# Patient Record
Sex: MALE | Race: WHITE | NOT HISPANIC OR LATINO | URBAN - METROPOLITAN AREA
[De-identification: names, ages, dates, MRNs, and addresses within clinical notes are randomized per-mention and may not be internally consistent; named-entity substitution may affect disease eponyms.]

---

## 2023-04-21 LAB
ALANINE AMINOTRANSFERASE (SGPT) (U/L) IN SER/PLAS: 11 U/L (ref 10–52)
ALBUMIN (G/DL) IN SER/PLAS: 4.1 G/DL (ref 3.4–5)
ALKALINE PHOSPHATASE (U/L) IN SER/PLAS: 70 U/L (ref 33–136)
ANION GAP IN SER/PLAS: 18 MMOL/L (ref 10–20)
ASPARTATE AMINOTRANSFERASE (SGOT) (U/L) IN SER/PLAS: 13 U/L (ref 9–39)
BILIRUBIN TOTAL (MG/DL) IN SER/PLAS: 0.4 MG/DL (ref 0–1.2)
CALCIUM (MG/DL) IN SER/PLAS: 8.7 MG/DL (ref 8.6–10.6)
CARBON DIOXIDE, TOTAL (MMOL/L) IN SER/PLAS: 24 MMOL/L (ref 21–32)
CHLORIDE (MMOL/L) IN SER/PLAS: 103 MMOL/L (ref 98–107)
CREATININE (MG/DL) IN SER/PLAS: 1.85 MG/DL (ref 0.5–1.3)
ERYTHROCYTE DISTRIBUTION WIDTH (RATIO) BY AUTOMATED COUNT: 12.8 % (ref 11.5–14.5)
ERYTHROCYTE MEAN CORPUSCULAR HEMOGLOBIN CONCENTRATION (G/DL) BY AUTOMATED: 31.5 G/DL (ref 32–36)
ERYTHROCYTE MEAN CORPUSCULAR VOLUME (FL) BY AUTOMATED COUNT: 96 FL (ref 80–100)
ERYTHROCYTES (10*6/UL) IN BLOOD BY AUTOMATED COUNT: 4.36 X10E12/L (ref 4.5–5.9)
GFR MALE: 33 ML/MIN/1.73M2
GLUCOSE (MG/DL) IN SER/PLAS: 107 MG/DL (ref 74–99)
HEMATOCRIT (%) IN BLOOD BY AUTOMATED COUNT: 41.9 % (ref 41–52)
HEMOGLOBIN (G/DL) IN BLOOD: 13.2 G/DL (ref 13.5–17.5)
LEUKOCYTES (10*3/UL) IN BLOOD BY AUTOMATED COUNT: 10.6 X10E9/L (ref 4.4–11.3)
NATRIURETIC PEPTIDE B (PG/ML) IN SER/PLAS: 84 PG/ML (ref 0–99)
NRBC (PER 100 WBCS) BY AUTOMATED COUNT: 0 /100 WBC (ref 0–0)
PLATELETS (10*3/UL) IN BLOOD AUTOMATED COUNT: 234 X10E9/L (ref 150–450)
POTASSIUM (MMOL/L) IN SER/PLAS: 5.1 MMOL/L (ref 3.5–5.3)
PROTEIN TOTAL: 6.8 G/DL (ref 6.4–8.2)
SODIUM (MMOL/L) IN SER/PLAS: 140 MMOL/L (ref 136–145)
UREA NITROGEN (MG/DL) IN SER/PLAS: 35 MG/DL (ref 6–23)

## 2023-04-25 ENCOUNTER — TELEPHONE (OUTPATIENT)
Dept: PRIMARY CARE | Facility: CLINIC | Age: 88
End: 2023-04-25
Payer: COMMERCIAL

## 2023-04-25 NOTE — TELEPHONE ENCOUNTER
Grisel vera nurse from the [facility] is calling she needs to speak to you to get orders for med changes. Please call [phone].

## 2023-06-02 ENCOUNTER — TELEPHONE (OUTPATIENT)
Dept: PRIMARY CARE | Facility: CLINIC | Age: 88
End: 2023-06-02

## 2023-06-02 NOTE — TELEPHONE ENCOUNTER
Grisel from [facility] wanted to let you know that the patient was admitted to [facility] for a right sided stroke.

## 2023-06-09 ENCOUNTER — NURSING HOME VISIT (OUTPATIENT)
Dept: POST ACUTE CARE | Facility: EXTERNAL LOCATION | Age: 88
End: 2023-06-09
Payer: MEDICARE

## 2023-06-09 DIAGNOSIS — E03.8 OTHER SPECIFIED HYPOTHYROIDISM: Primary | ICD-10-CM

## 2023-06-09 DIAGNOSIS — I63.50 CEREBROVASCULAR ACCIDENT (CVA) DUE TO STENOSIS OF CEREBRAL ARTERY (MULTI): ICD-10-CM

## 2023-06-09 DIAGNOSIS — I25.119 CORONARY ARTERY DISEASE INVOLVING NATIVE CORONARY ARTERY OF NATIVE HEART WITH ANGINA PECTORIS (CMS-HCC): ICD-10-CM

## 2023-06-09 DIAGNOSIS — I10 PRIMARY HYPERTENSION: ICD-10-CM

## 2023-06-09 PROCEDURE — 99306 1ST NF CARE HIGH MDM 50: CPT | Performed by: STUDENT IN AN ORGANIZED HEALTH CARE EDUCATION/TRAINING PROGRAM

## 2023-06-09 NOTE — LETTER
Patient: Dean Florian  : 1926    Encounter Date: 2023    DEAN FLORIAN is a 89 year old Male with PMHX of atrial fibrillation(s/p watchman device, not on anticoagulation), HTN, CAD, CKD, hypothyroidism who was initially managed in [facility] for left frontal subacute stroke after presenting with left sided weakness. He was initially under hospice care with [facility] which was later revoked and further work up was initiated. During hospitalization, his CT head was unremarkable however MRI brain revealed left frontal cortex subacute ischemia concerning for ischemic stroke. MRA head/neck showed no evidence of major cutoff or significant stenosis. 2D Echo revealed normal LV function with EF of 60-65% and impaired LV diastolic filling, no major valvular disorder. He was started on ASA, atorvastatin & clopidogrel. He was evaluated by neurology and Cardiology (EP) recommended low dose sotalol given pt's renal function. MITCH was recommended to r/o LV thrombus but pt's family was not inclined to aggressive treatment. PT/OT evaluated and recommended SNF for rehab. He was discharged on Atorvastatin 80mg, Plavix 75mg, Sotalol 60mg, Alfuzosin 10mg. No ASA as requested by her daughter(POA)  PMHx: As in HPI   PSHx: watchman device placement, cardiac catheterization with 6 stents in place, hernia repair, tonsillectomy  SHx: Denies smoking history, rare EtOH use, denies illicit drug use  Labs reviewed  ROS: All 10 system reviewed and were negative except left sided muscle weakness    Physical Exam  General: Alert & oriented x1, awake & cooperative, not in distress  HEENT: atraumatic head, PERRL, EOMI, moist mucus membrane  Cranial nerves: II-XII gross normal except for decreased hearing (has hearing aid)  Muscle strength: 1/5 in RUE, 4/5 in LUE, 3/5 in RLE, 4/5 in LLE  Muscle tone/reflexes: normal  Sensation: normal to light touch  No involuntary muscle movement or clonus  Lungs: CTAB  Heart: normal S1/S2, no  murmur  Abdomen: Soft, NT, ND, BS present, no organomegaly  Ext: normal, no edema    Assessment/Plan  # Left subacute ischemic stroke  PT/OT at least 5x/weekly  Continue atorvastatin 80mg & Plavix 75mg  Neurochecks  F/u with Dr. Rubio on 07/10      #Afib s/p watchman device  #HTN  #CAD  BP soft  Check vitals before administering medications   Continue Alfuzosin 10mg daily (hold for SBP <100)  Continue Sotalol 60mg daily  Continue Lasix, Lipitor & Plavix  F/u with Dr. Walters on 07/14    # Hypothyroidism  Continue Levothyroxine 75mcg MWF    Will try to reach out to family to discuss, attempted to call on day of evaluation    >45 minutes spent in management of pt      Electronically Signed By: Alex Beebe DO   6/11/23  3:55 PM

## 2023-06-11 PROBLEM — I25.119 CORONARY ARTERY DISEASE INVOLVING NATIVE CORONARY ARTERY OF NATIVE HEART WITH ANGINA PECTORIS (CMS-HCC): Status: ACTIVE | Noted: 2023-06-11

## 2023-06-11 PROBLEM — I10 PRIMARY HYPERTENSION: Status: ACTIVE | Noted: 2023-06-11

## 2023-06-11 PROBLEM — I63.50 CEREBROVASCULAR ACCIDENT (CVA) DUE TO STENOSIS OF CEREBRAL ARTERY (MULTI): Status: ACTIVE | Noted: 2023-06-11

## 2023-06-11 PROBLEM — E03.8 OTHER SPECIFIED HYPOTHYROIDISM: Status: ACTIVE | Noted: 2023-06-11

## 2023-06-11 NOTE — PROGRESS NOTES
IVONE HERNANDEZ is a 89 year old Male with PMHX of atrial fibrillation(s/p watchman device, not on anticoagulation), HTN, CAD, CKD, hypothyroidism who was initially managed in [facility] for left frontal subacute stroke after presenting with left sided weakness. He was initially under hospice care with [facility] which was later revoked and further work up was initiated. During hospitalization, his CT head was unremarkable however MRI brain revealed left frontal cortex subacute ischemia concerning for ischemic stroke. MRA head/neck showed no evidence of major cutoff or significant stenosis. 2D Echo revealed normal LV function with EF of 60-65% and impaired LV diastolic filling, no major valvular disorder. He was started on ASA, atorvastatin & clopidogrel. He was evaluated by neurology and Cardiology (EP) recommended low dose sotalol given pt's renal function. MITCH was recommended to r/o LV thrombus but pt's family was not inclined to aggressive treatment. PT/OT evaluated and recommended SNF for rehab. He was discharged on Atorvastatin 80mg, Plavix 75mg, Sotalol 60mg, Alfuzosin 10mg. No ASA as requested by her daughter(POA)  PMHx: As in HPI   PSHx: watchman device placement, cardiac catheterization with 6 stents in place, hernia repair, tonsillectomy  SHx: Denies smoking history, rare EtOH use, denies illicit drug use  Labs reviewed  ROS: All 10 system reviewed and were negative except left sided muscle weakness    Physical Exam  General: Alert & oriented x1, awake & cooperative, not in distress  HEENT: atraumatic head, PERRL, EOMI, moist mucus membrane  Cranial nerves: II-XII gross normal except for decreased hearing (has hearing aid)  Muscle strength: 1/5 in RUE, 4/5 in LUE, 3/5 in RLE, 4/5 in LLE  Muscle tone/reflexes: normal  Sensation: normal to light touch  No involuntary muscle movement or clonus  Lungs: CTAB  Heart: normal S1/S2, no murmur  Abdomen: Soft, NT, ND, BS present, no organomegaly  Ext: normal, no  edema    Assessment/Plan  # Left subacute ischemic stroke  PT/OT at least 5x/weekly  Continue atorvastatin 80mg & Plavix 75mg  Neurochecks  F/u with Dr. Rubio on 07/10      #Afib s/p watchman device  #HTN  #CAD  BP soft  Check vitals before administering medications   Continue Alfuzosin 10mg daily (hold for SBP <100)  Continue Sotalol 60mg daily  Continue Lasix, Lipitor & Plavix  F/u with Dr. Walters on 07/14    # Hypothyroidism  Continue Levothyroxine 75mcg MWF    Will try to reach out to family to discuss, attempted to call on day of evaluation    >45 minutes spent in management of pt

## 2023-06-12 ENCOUNTER — NURSING HOME VISIT (OUTPATIENT)
Dept: POST ACUTE CARE | Facility: EXTERNAL LOCATION | Age: 88
End: 2023-06-12
Payer: MEDICARE

## 2023-06-12 DIAGNOSIS — I63.50 CEREBROVASCULAR ACCIDENT (CVA) DUE TO STENOSIS OF CEREBRAL ARTERY (MULTI): Primary | ICD-10-CM

## 2023-06-12 DIAGNOSIS — I25.119 CORONARY ARTERY DISEASE INVOLVING NATIVE CORONARY ARTERY OF NATIVE HEART WITH ANGINA PECTORIS (CMS-HCC): ICD-10-CM

## 2023-06-12 DIAGNOSIS — R13.19 OTHER DYSPHAGIA: ICD-10-CM

## 2023-06-12 DIAGNOSIS — I10 PRIMARY HYPERTENSION: ICD-10-CM

## 2023-06-12 DIAGNOSIS — E03.8 OTHER SPECIFIED HYPOTHYROIDISM: ICD-10-CM

## 2023-06-12 PROCEDURE — 99309 SBSQ NF CARE MODERATE MDM 30: CPT | Performed by: STUDENT IN AN ORGANIZED HEALTH CARE EDUCATION/TRAINING PROGRAM

## 2023-06-12 ASSESSMENT — ENCOUNTER SYMPTOMS
CHOKING: 1
APPETITE CHANGE: 1
NEUROLOGICAL NEGATIVE: 1
CARDIOVASCULAR NEGATIVE: 1
GASTROINTESTINAL NEGATIVE: 1
FATIGUE: 1
COUGH: 1
MUSCULOSKELETAL NEGATIVE: 1
PSYCHIATRIC NEGATIVE: 1
DIZZINESS: 0

## 2023-06-12 NOTE — LETTER
Patient: Dean Florian  : 1926    Encounter Date: 2023    Subjective  Patient ID: Dean Florian is a 89 y.o. male.    Pt seen at bedside. Concerns from weekend noted patient having worsening secrections and overall concerns for heart failure. Patients weight has been remaining stable however noted difficulty clearing secrections. Blood pressure noted to be borderline as well. Pt unable to provide any additional history.        Review of Systems   Constitutional:  Positive for appetite change and fatigue.   HENT: Negative.     Respiratory:  Positive for cough and choking.    Cardiovascular: Negative.    Gastrointestinal: Negative.    Musculoskeletal: Negative.    Skin: Negative.    Neurological: Negative.  Negative for dizziness.   Psychiatric/Behavioral: Negative.         ObjectiveVitals Reviewed via facility EMR   Physical Exam  Vitals and nursing note reviewed.   Constitutional:       General: He is not in acute distress.     Appearance: Normal appearance.   HENT:      Mouth/Throat:      Mouth: Mucous membranes are moist.      Pharynx: Oropharynx is clear. No oropharyngeal exudate.   Eyes:      Extraocular Movements: Extraocular movements intact.      Pupils: Pupils are equal, round, and reactive to light.   Cardiovascular:      Rate and Rhythm: Normal rate and regular rhythm.      Pulses: Normal pulses.      Heart sounds: Normal heart sounds. No murmur heard.  Pulmonary:      Effort: Respiratory distress present.      Breath sounds: Stridor present. Wheezing present.      Comments: Difficulty clearing secrections  Abdominal:      General: Abdomen is flat. Bowel sounds are normal. There is no distension.      Tenderness: There is no abdominal tenderness.   Musculoskeletal:         General: Normal range of motion.      Right lower leg: No edema.      Left lower leg: No edema.   Skin:     General: Skin is warm and dry.      Capillary Refill: Capillary refill takes less than 2 seconds.   Neurological:       General: No focal deficit present.      Mental Status: He is alert. Mental status is at baseline.      Cranial Nerves: No cranial nerve deficit.      Motor: No weakness.   Psychiatric:         Mood and Affect: Mood normal.         Thought Content: Thought content normal.         Assessment/Plan  Diagnoses and all orders for this visit:  Cerebrovascular accident (CVA) due to stenosis of cerebral artery (CMS/HCC)  Primary hypertension  Other specified hypothyroidism  Other dysphagia  Coronary artery disease involving native coronary artery of native heart with angina pectoris (CMS/HCC)      Pt seen and examined, overall euvolemic, do not suspect chf. Chest x-ray did not show any infiltrates. Will perform aspiration precautions, HOB elevation to 30 degrees. Check CBC, BNP CMP and prolactin. Will have speech re-evaluate patient as I suspect continued aspiration. Monitor weights closely. Would hold off on aggressive diuresis at this time due to borderline hypotension. Consider GOC discussions pending clinical course. Supportive care    >30 mintues spent in management of pt      Electronically Signed By: Alex Beebe DO   6/12/23  9:26 PM

## 2023-06-13 PROBLEM — N18.9 ANEMIA IN CKD (CHRONIC KIDNEY DISEASE): Status: ACTIVE | Noted: 2023-06-13

## 2023-06-13 PROBLEM — R74.8 HIGH LEVEL OF CARDIAC MARKER: Status: ACTIVE | Noted: 2023-06-13

## 2023-06-13 PROBLEM — M21.371 RIGHT FOOT DROP: Status: ACTIVE | Noted: 2023-06-13

## 2023-06-13 PROBLEM — I51.9 MILD DIASTOLIC DYSFUNCTION: Status: ACTIVE | Noted: 2023-06-13

## 2023-06-13 PROBLEM — I48.20 CHRONIC ATRIAL FIBRILLATION (MULTI): Status: ACTIVE | Noted: 2023-06-13

## 2023-06-13 PROBLEM — E78.5 HLD (HYPERLIPIDEMIA): Status: ACTIVE | Noted: 2023-06-13

## 2023-06-13 PROBLEM — I48.0 PAROXYSMAL ATRIAL FIBRILLATION (MULTI): Status: ACTIVE | Noted: 2023-06-13

## 2023-06-13 PROBLEM — M19.90 OSTEOARTHRITIS: Status: ACTIVE | Noted: 2023-06-13

## 2023-06-13 PROBLEM — N18.31 STAGE 3A CHRONIC KIDNEY DISEASE (MULTI): Status: ACTIVE | Noted: 2023-06-13

## 2023-06-13 PROBLEM — M54.50 LOWER BACK PAIN: Status: ACTIVE | Noted: 2023-06-13

## 2023-06-13 PROBLEM — N18.30 CKD (CHRONIC KIDNEY DISEASE) STAGE 3, GFR 30-59 ML/MIN (MULTI): Status: ACTIVE | Noted: 2023-06-13

## 2023-06-13 PROBLEM — R26.89 IMPAIRMENT OF BALANCE: Status: ACTIVE | Noted: 2023-06-13

## 2023-06-13 PROBLEM — I49.5 SICK SINUS SYNDROME (MULTI): Status: ACTIVE | Noted: 2023-06-13

## 2023-06-13 PROBLEM — R53.1 RIGHT SIDED WEAKNESS: Status: ACTIVE | Noted: 2023-06-13

## 2023-06-13 PROBLEM — R33.8 ACUTE URINARY RETENTION: Status: ACTIVE | Noted: 2023-06-13

## 2023-06-13 PROBLEM — B36.9 DERMATOMYCOSIS: Status: ACTIVE | Noted: 2023-06-13

## 2023-06-13 PROBLEM — Z95.818 PRESENCE OF WATCHMAN LEFT ATRIAL APPENDAGE CLOSURE DEVICE: Status: ACTIVE | Noted: 2023-06-13

## 2023-06-13 PROBLEM — H91.10 PRESBYCUSIS: Status: ACTIVE | Noted: 2023-06-13

## 2023-06-13 PROBLEM — N40.0 BENIGN ENLARGEMENT OF PROSTATE: Status: ACTIVE | Noted: 2023-06-13

## 2023-06-13 PROBLEM — M17.9 DJD (DEGENERATIVE JOINT DISEASE) OF KNEE: Status: ACTIVE | Noted: 2023-06-13

## 2023-06-13 PROBLEM — E55.9 VITAMIN D DEFICIENCY: Status: ACTIVE | Noted: 2023-06-13

## 2023-06-13 PROBLEM — D63.1 ANEMIA IN CKD (CHRONIC KIDNEY DISEASE): Status: ACTIVE | Noted: 2023-06-13

## 2023-06-13 PROBLEM — R05.3 CHRONIC COUGH: Status: ACTIVE | Noted: 2023-06-13

## 2023-06-13 PROBLEM — U07.1 COVID-19 VIRUS INFECTION: Status: ACTIVE | Noted: 2023-06-13

## 2023-06-13 PROBLEM — L84 CORN OF TOE: Status: ACTIVE | Noted: 2023-06-13

## 2023-06-13 PROBLEM — M81.0 OSTEOPOROSIS, SENILE: Status: ACTIVE | Noted: 2023-06-13

## 2023-06-13 PROBLEM — K21.9 GERD (GASTROESOPHAGEAL REFLUX DISEASE): Status: ACTIVE | Noted: 2023-06-13

## 2023-06-13 PROBLEM — F33.9 DEPRESSION, RECURRENT (CMS-HCC): Status: ACTIVE | Noted: 2023-06-13

## 2023-06-13 RX ORDER — PSEUDOEPHEDRINE HCL 30 MG
TABLET ORAL
COMMUNITY
Start: 2020-11-27 | End: 2023-07-26 | Stop reason: ALTCHOICE

## 2023-06-13 RX ORDER — LOSARTAN POTASSIUM 25 MG/1
1 TABLET ORAL DAILY
COMMUNITY
Start: 2018-12-03 | End: 2023-07-26 | Stop reason: ALTCHOICE

## 2023-06-13 RX ORDER — LEVOTHYROXINE SODIUM 75 UG/1
1 TABLET ORAL 3 TIMES WEEKLY
COMMUNITY
Start: 2016-04-12

## 2023-06-13 RX ORDER — CLOPIDOGREL BISULFATE 75 MG/1
1 TABLET ORAL DAILY
COMMUNITY
Start: 2013-12-17

## 2023-06-13 RX ORDER — NYSTATIN AND TRIAMCINOLONE ACETONIDE 100000; 1 [USP'U]/G; MG/G
OINTMENT TOPICAL 2 TIMES DAILY
COMMUNITY
Start: 2023-02-16 | End: 2023-07-26 | Stop reason: ALTCHOICE

## 2023-06-13 RX ORDER — GLUC/MSM/COLGN2/HYAL/ANTIARTH3 375-375-20
1 TABLET ORAL DAILY
COMMUNITY

## 2023-06-13 RX ORDER — SPIRONOLACTONE 25 MG/1
TABLET ORAL DAILY
COMMUNITY
End: 2023-07-26 | Stop reason: ALTCHOICE

## 2023-06-13 RX ORDER — ALBUTEROL SULFATE 0.83 MG/ML
2.5 SOLUTION RESPIRATORY (INHALATION)
COMMUNITY

## 2023-06-13 RX ORDER — MULTIVITAMIN
1 TABLET ORAL DAILY
COMMUNITY
End: 2023-07-26 | Stop reason: ALTCHOICE

## 2023-06-13 RX ORDER — SOTALOL HYDROCHLORIDE 80 MG/1
TABLET ORAL 2 TIMES DAILY
COMMUNITY
Start: 2022-06-04 | End: 2023-07-26 | Stop reason: ALTCHOICE

## 2023-06-13 RX ORDER — FUROSEMIDE 20 MG/1
TABLET ORAL 2 TIMES DAILY
COMMUNITY
End: 2023-08-22 | Stop reason: SDUPTHER

## 2023-06-13 NOTE — PROGRESS NOTES
Subjective   Patient ID: Dean Florian is a 89 y.o. male.    Pt seen at bedside. Concerns from weekend noted patient having worsening secrections and overall concerns for heart failure. Patients weight has been remaining stable however noted difficulty clearing secrections. Blood pressure noted to be borderline as well. Pt unable to provide any additional history.        Review of Systems   Constitutional:  Positive for appetite change and fatigue.   HENT: Negative.     Respiratory:  Positive for cough and choking.    Cardiovascular: Negative.    Gastrointestinal: Negative.    Musculoskeletal: Negative.    Skin: Negative.    Neurological: Negative.  Negative for dizziness.   Psychiatric/Behavioral: Negative.         Objective Vitals Reviewed via facility EMR   Physical Exam  Vitals and nursing note reviewed.   Constitutional:       General: He is not in acute distress.     Appearance: Normal appearance.   HENT:      Mouth/Throat:      Mouth: Mucous membranes are moist.      Pharynx: Oropharynx is clear. No oropharyngeal exudate.   Eyes:      Extraocular Movements: Extraocular movements intact.      Pupils: Pupils are equal, round, and reactive to light.   Cardiovascular:      Rate and Rhythm: Normal rate and regular rhythm.      Pulses: Normal pulses.      Heart sounds: Normal heart sounds. No murmur heard.  Pulmonary:      Effort: Respiratory distress present.      Breath sounds: Stridor present. Wheezing present.      Comments: Difficulty clearing secrections  Abdominal:      General: Abdomen is flat. Bowel sounds are normal. There is no distension.      Tenderness: There is no abdominal tenderness.   Musculoskeletal:         General: Normal range of motion.      Right lower leg: No edema.      Left lower leg: No edema.   Skin:     General: Skin is warm and dry.      Capillary Refill: Capillary refill takes less than 2 seconds.   Neurological:      General: No focal deficit present.      Mental Status: He is alert.  Mental status is at baseline.      Cranial Nerves: No cranial nerve deficit.      Motor: No weakness.   Psychiatric:         Mood and Affect: Mood normal.         Thought Content: Thought content normal.         Assessment/Plan   Diagnoses and all orders for this visit:  Cerebrovascular accident (CVA) due to stenosis of cerebral artery (CMS/[facility])  Primary hypertension  Other specified hypothyroidism  Other dysphagia  Coronary artery disease involving native coronary artery of native heart with angina pectoris (CMS/[facility])      Pt seen and examined, overall euvolemic, do not suspect chf. Chest x-ray did not show any infiltrates. Will perform aspiration precautions, HOB elevation to 30 degrees. Check CBC, BNP CMP and prolactin. Will have speech re-evaluate patient as I suspect continued aspiration. Monitor weights closely. Would hold off on aggressive diuresis at this time due to borderline hypotension. Consider GOC discussions pending clinical course. Supportive care    >30 mintues spent in management of pt

## 2023-06-14 ENCOUNTER — NURSING HOME VISIT (OUTPATIENT)
Dept: POST ACUTE CARE | Facility: EXTERNAL LOCATION | Age: 88
End: 2023-06-14
Payer: MEDICARE

## 2023-06-14 DIAGNOSIS — N18.31 STAGE 3A CHRONIC KIDNEY DISEASE (MULTI): ICD-10-CM

## 2023-06-14 DIAGNOSIS — I48.0 PAROXYSMAL ATRIAL FIBRILLATION (MULTI): ICD-10-CM

## 2023-06-14 DIAGNOSIS — Z71.89 GOALS OF CARE, COUNSELING/DISCUSSION: Primary | ICD-10-CM

## 2023-06-14 DIAGNOSIS — I63.50 CEREBROVASCULAR ACCIDENT (CVA) DUE TO STENOSIS OF CEREBRAL ARTERY (MULTI): ICD-10-CM

## 2023-06-14 DIAGNOSIS — I10 PRIMARY HYPERTENSION: ICD-10-CM

## 2023-06-14 DIAGNOSIS — R13.19 OTHER DYSPHAGIA: ICD-10-CM

## 2023-06-14 PROCEDURE — 99310 SBSQ NF CARE HIGH MDM 45: CPT | Performed by: STUDENT IN AN ORGANIZED HEALTH CARE EDUCATION/TRAINING PROGRAM

## 2023-06-14 ASSESSMENT — ENCOUNTER SYMPTOMS
GASTROINTESTINAL NEGATIVE: 1
NEUROLOGICAL NEGATIVE: 1
MUSCULOSKELETAL NEGATIVE: 1
CARDIOVASCULAR NEGATIVE: 1
CONSTITUTIONAL NEGATIVE: 1
WHEEZING: 1
PSYCHIATRIC NEGATIVE: 1

## 2023-06-14 NOTE — LETTER
Patient: Dean Florian  : 1926    Encounter Date: 2023    Subjective  Patient ID: Dean Florian is a 89 y.o. male.    Pt seen and examined, BNP elevated however suspect that this is chronic, started on oral diuresis. In addition, procal was negative so low suspicion for pneumonia. Patient has had minimal improvement with therapy and is still having difficulty getting out of bed. Pt was evaluated by speech who did recommend a modified diet. Was able to discuss care and goals with daughter. She states that this has been a chronic issue and patient has been to multiple nursing facilities. Was previously on hospice care as well. POA noted that they wanted the patient to continue to get therapy however would be interested in palliative care moving forward. No additional issues or concerns.         Review of Systems   Constitutional: Negative.    HENT: Negative.     Respiratory:  Positive for wheezing.         Dysphagia   Cardiovascular: Negative.    Gastrointestinal: Negative.    Musculoskeletal: Negative.    Skin: Negative.    Neurological: Negative.    Psychiatric/Behavioral: Negative.         ObjectiveVitals Reviewed via facility EMR   Physical Exam  Vitals and nursing note reviewed.   Constitutional:       General: He is not in acute distress.     Appearance: Normal appearance.   HENT:      Mouth/Throat:      Mouth: Mucous membranes are moist.      Pharynx: Oropharynx is clear. No oropharyngeal exudate.   Eyes:      Extraocular Movements: Extraocular movements intact.      Pupils: Pupils are equal, round, and reactive to light.   Cardiovascular:      Rate and Rhythm: Normal rate and regular rhythm.      Pulses: Normal pulses.      Heart sounds: Normal heart sounds. No murmur heard.  Pulmonary:      Effort: Pulmonary effort is normal. No respiratory distress.      Breath sounds: Wheezing and rales present.      Comments: Secrections present  Abdominal:      General: Abdomen is flat. Bowel sounds are normal.  There is no distension.      Tenderness: There is no abdominal tenderness.   Musculoskeletal:         General: Normal range of motion.      Right lower leg: No edema.      Left lower leg: No edema.   Skin:     General: Skin is warm and dry.      Capillary Refill: Capillary refill takes less than 2 seconds.   Neurological:      General: No focal deficit present.      Mental Status: He is alert. Mental status is at baseline.      Comments: At baseline   Psychiatric:         Mood and Affect: Mood normal.         Thought Content: Thought content normal.         Assessment/Plan  Diagnoses and all orders for this visit:  Goals of care, counseling/discussion  Stage 3a chronic kidney disease  Other dysphagia  Primary hypertension  Paroxysmal atrial fibrillation (CMS/HCC)  Cerebrovascular accident (CVA) due to stenosis of cerebral artery (CMS/HCC)      Pt seen and examined, overall medically stable, SLP did note that patient has chronic dysphagia and did recommend a modified diet however is still high risk of aspiration. Would not recommend feeding by other means as pt did not tolerate these and likely would gain no benefit from this. Discussed goals of care with POA and noted that patient might benefit from pallaitive.hospice evaluation. They were agreeable, will make referrals per family choice. Continue supprotive care, pt/ot    >45 minutes spent in management of pt      Electronically Signed By: Alex Beebe DO   6/14/23  9:44 PM

## 2023-06-15 NOTE — PROGRESS NOTES
Subjective   Patient ID: Dean Florian is a 89 y.o. male.    Pt seen and examined, BNP elevated however suspect that this is chronic, started on oral diuresis. In addition, procal was negative so low suspicion for pneumonia. Patient has had minimal improvement with therapy and is still having difficulty getting out of bed. Pt was evaluated by speech who did recommend a modified diet. Was able to discuss care and goals with daughter. She states that this has been a chronic issue and patient has been to multiple nursing facilities. Was previously on hospice care as well. POA noted that they wanted the patient to continue to get therapy however would be interested in palliative care moving forward. No additional issues or concerns.         Review of Systems   Constitutional: Negative.    HENT: Negative.     Respiratory:  Positive for wheezing.         Dysphagia   Cardiovascular: Negative.    Gastrointestinal: Negative.    Musculoskeletal: Negative.    Skin: Negative.    Neurological: Negative.    Psychiatric/Behavioral: Negative.         Objective Vitals Reviewed via facility EMR   Physical Exam  Vitals and nursing note reviewed.   Constitutional:       General: He is not in acute distress.     Appearance: Normal appearance.   HENT:      Mouth/Throat:      Mouth: Mucous membranes are moist.      Pharynx: Oropharynx is clear. No oropharyngeal exudate.   Eyes:      Extraocular Movements: Extraocular movements intact.      Pupils: Pupils are equal, round, and reactive to light.   Cardiovascular:      Rate and Rhythm: Normal rate and regular rhythm.      Pulses: Normal pulses.      Heart sounds: Normal heart sounds. No murmur heard.  Pulmonary:      Effort: Pulmonary effort is normal. No respiratory distress.      Breath sounds: Wheezing and rales present.      Comments: Secrections present  Abdominal:      General: Abdomen is flat. Bowel sounds are normal. There is no distension.      Tenderness: There is no abdominal  tenderness.   Musculoskeletal:         General: Normal range of motion.      Right lower leg: No edema.      Left lower leg: No edema.   Skin:     General: Skin is warm and dry.      Capillary Refill: Capillary refill takes less than 2 seconds.   Neurological:      General: No focal deficit present.      Mental Status: He is alert. Mental status is at baseline.      Comments: At baseline   Psychiatric:         Mood and Affect: Mood normal.         Thought Content: Thought content normal.         Assessment/Plan   Diagnoses and all orders for this visit:  Goals of care, counseling/discussion  Stage 3a chronic kidney disease  Other dysphagia  Primary hypertension  Paroxysmal atrial fibrillation (CMS/HCC)  Cerebrovascular accident (CVA) due to stenosis of cerebral artery (CMS/HCC)      Pt seen and examined, overall medically stable, SLP did note that patient has chronic dysphagia and did recommend a modified diet however is still high risk of aspiration. Would not recommend feeding by other means as pt did not tolerate these and likely would gain no benefit from this. Discussed goals of care with POA and noted that patient might benefit from pallaitive.hospice evaluation. They were agreeable, will make referrals per family choice. Continue supprotive care, pt/ot    >45 minutes spent in management of pt

## 2023-06-16 ENCOUNTER — APPOINTMENT (OUTPATIENT)
Dept: PRIMARY CARE | Facility: CLINIC | Age: 88
End: 2023-06-16
Payer: MEDICARE

## 2023-06-16 ENCOUNTER — NURSING HOME VISIT (OUTPATIENT)
Dept: POST ACUTE CARE | Facility: EXTERNAL LOCATION | Age: 88
End: 2023-06-16

## 2023-06-16 DIAGNOSIS — Z71.89 GOALS OF CARE, COUNSELING/DISCUSSION: ICD-10-CM

## 2023-06-16 DIAGNOSIS — I48.20 CHRONIC ATRIAL FIBRILLATION (MULTI): Primary | ICD-10-CM

## 2023-06-16 DIAGNOSIS — I25.119 CORONARY ARTERY DISEASE INVOLVING NATIVE CORONARY ARTERY OF NATIVE HEART WITH ANGINA PECTORIS (CMS-HCC): ICD-10-CM

## 2023-06-16 DIAGNOSIS — N18.31 STAGE 3A CHRONIC KIDNEY DISEASE (MULTI): ICD-10-CM

## 2023-06-16 DIAGNOSIS — I49.5 SICK SINUS SYNDROME (MULTI): ICD-10-CM

## 2023-06-16 DIAGNOSIS — N18.30 STAGE 3 CHRONIC KIDNEY DISEASE, UNSPECIFIED WHETHER STAGE 3A OR 3B CKD (MULTI): ICD-10-CM

## 2023-06-16 PROCEDURE — 99308 SBSQ NF CARE LOW MDM 20: CPT | Performed by: STUDENT IN AN ORGANIZED HEALTH CARE EDUCATION/TRAINING PROGRAM

## 2023-06-16 NOTE — LETTER
Patient: Dean Florian  : 1926    Encounter Date: 2023    Subjective  Patient ID: Dean Florian is a 89 y.o. male.    Pt seen and examined, having minimal benefit with therapy and having difficulty clearing secrections. Family considering palliative care and this was discussed with POA. No additional issues.        Review of Systems   Constitutional: Negative.    HENT: Negative.     Respiratory: Negative.     Gastrointestinal: Negative.    Musculoskeletal: Negative.    Skin: Negative.    Neurological: Negative.    Psychiatric/Behavioral: Negative.         ObjectiveVitals Reviewed via facility EMR   Physical Exam  Vitals and nursing note reviewed.   Constitutional:       General: He is not in acute distress.     Appearance: Normal appearance.   HENT:      Mouth/Throat:      Mouth: Mucous membranes are moist.      Pharynx: Oropharynx is clear. No oropharyngeal exudate.   Eyes:      Extraocular Movements: Extraocular movements intact.      Pupils: Pupils are equal, round, and reactive to light.   Cardiovascular:      Rate and Rhythm: Normal rate and regular rhythm.      Pulses: Normal pulses.      Heart sounds: Normal heart sounds. No murmur heard.  Pulmonary:      Effort: Pulmonary effort is normal. No respiratory distress.      Comments: Secrections present  Abdominal:      General: Abdomen is flat. Bowel sounds are normal. There is no distension.      Tenderness: There is no abdominal tenderness.   Musculoskeletal:         General: Normal range of motion.      Right lower leg: No edema.      Left lower leg: No edema.   Skin:     General: Skin is warm and dry.      Capillary Refill: Capillary refill takes less than 2 seconds.   Neurological:      General: No focal deficit present.      Mental Status: He is alert. Mental status is at baseline.      Cranial Nerves: No cranial nerve deficit.      Motor: No weakness.   Psychiatric:         Mood and Affect: Mood normal.         Thought Content: Thought content  normal.         Assessment/Plan  Diagnoses and all orders for this visit:  Chronic atrial fibrillation (CMS/HCC)  Coronary artery disease involving native coronary artery of native heart with angina pectoris (CMS/[facility])  Sick sinus syndrome (CMS/[facility])  Stage 3 chronic kidney disease, unspecified whether stage 3a or 3b CKD (CMS/[facility])  Stage 3a chronic kidney disease  Goals of care, counseling/discussion  pt seen and examined, having minimal improvement with therapy, continue goals of care discussions, palliative care consultation, monitor labs, continue aspiration precautions        Electronically Signed By: Alex Beebe DO   6/18/23  4:45 PM

## 2023-06-18 ASSESSMENT — ENCOUNTER SYMPTOMS
MUSCULOSKELETAL NEGATIVE: 1
RESPIRATORY NEGATIVE: 1
CONSTITUTIONAL NEGATIVE: 1
NEUROLOGICAL NEGATIVE: 1
GASTROINTESTINAL NEGATIVE: 1
PSYCHIATRIC NEGATIVE: 1

## 2023-06-18 NOTE — PROGRESS NOTES
Subjective   Patient ID: Dean Florian is a 89 y.o. male.    Pt seen and examined, having minimal benefit with therapy and having difficulty clearing secrections. Family considering palliative care and this was discussed with POA. No additional issues.        Review of Systems   Constitutional: Negative.    HENT: Negative.     Respiratory: Negative.     Gastrointestinal: Negative.    Musculoskeletal: Negative.    Skin: Negative.    Neurological: Negative.    Psychiatric/Behavioral: Negative.         Objective Vitals Reviewed via facility EMR   Physical Exam  Vitals and nursing note reviewed.   Constitutional:       General: He is not in acute distress.     Appearance: Normal appearance.   HENT:      Mouth/Throat:      Mouth: Mucous membranes are moist.      Pharynx: Oropharynx is clear. No oropharyngeal exudate.   Eyes:      Extraocular Movements: Extraocular movements intact.      Pupils: Pupils are equal, round, and reactive to light.   Cardiovascular:      Rate and Rhythm: Normal rate and regular rhythm.      Pulses: Normal pulses.      Heart sounds: Normal heart sounds. No murmur heard.  Pulmonary:      Effort: Pulmonary effort is normal. No respiratory distress.      Comments: Secrections present  Abdominal:      General: Abdomen is flat. Bowel sounds are normal. There is no distension.      Tenderness: There is no abdominal tenderness.   Musculoskeletal:         General: Normal range of motion.      Right lower leg: No edema.      Left lower leg: No edema.   Skin:     General: Skin is warm and dry.      Capillary Refill: Capillary refill takes less than 2 seconds.   Neurological:      General: No focal deficit present.      Mental Status: He is alert. Mental status is at baseline.      Cranial Nerves: No cranial nerve deficit.      Motor: No weakness.   Psychiatric:         Mood and Affect: Mood normal.         Thought Content: Thought content normal.         Assessment/Plan   Diagnoses and all orders for this  visit:  Chronic atrial fibrillation (CMS/[facility])  Coronary artery disease involving native coronary artery of native heart with angina pectoris (CMS/[facility])  Sick sinus syndrome (CMS/[facility])  Stage 3 chronic kidney disease, unspecified whether stage 3a or 3b CKD (CMS/[facility])  Stage 3a chronic kidney disease  Goals of care, counseling/discussion  pt seen and examined, having minimal improvement with therapy, continue goals of care discussions, palliative care consultation, monitor labs, continue aspiration precautions

## 2023-06-21 ENCOUNTER — NURSING HOME VISIT (OUTPATIENT)
Dept: POST ACUTE CARE | Facility: EXTERNAL LOCATION | Age: 88
End: 2023-06-21
Payer: MEDICARE

## 2023-06-21 DIAGNOSIS — I63.50 CEREBROVASCULAR ACCIDENT (CVA) DUE TO STENOSIS OF CEREBRAL ARTERY (MULTI): Primary | ICD-10-CM

## 2023-06-21 DIAGNOSIS — Z71.89 GOALS OF CARE, COUNSELING/DISCUSSION: ICD-10-CM

## 2023-06-21 DIAGNOSIS — M54.50 LOW BACK PAIN, UNSPECIFIED BACK PAIN LATERALITY, UNSPECIFIED CHRONICITY, UNSPECIFIED WHETHER SCIATICA PRESENT: ICD-10-CM

## 2023-06-21 DIAGNOSIS — N18.30 STAGE 3 CHRONIC KIDNEY DISEASE, UNSPECIFIED WHETHER STAGE 3A OR 3B CKD (MULTI): ICD-10-CM

## 2023-06-21 DIAGNOSIS — E78.5 HYPERLIPIDEMIA, UNSPECIFIED HYPERLIPIDEMIA TYPE: ICD-10-CM

## 2023-06-21 DIAGNOSIS — R26.89 IMPAIRMENT OF BALANCE: ICD-10-CM

## 2023-06-21 DIAGNOSIS — R74.8 HIGH LEVEL OF CARDIAC MARKER: ICD-10-CM

## 2023-06-21 PROCEDURE — 99309 SBSQ NF CARE MODERATE MDM 30: CPT | Performed by: STUDENT IN AN ORGANIZED HEALTH CARE EDUCATION/TRAINING PROGRAM

## 2023-06-21 NOTE — LETTER
Patient: Dean Florian  : 1926    Encounter Date: 2023    Subjective  Patient ID: Dean Florian is a 89 y.o. male.    PT seen and evaluated, daughter is at bedside as well. Patient has had minimal improvement with therapy. Is off oxygen, daughter is wondering about overall prognosis. Still having difficulty swallowing which is baseline. No additional issues.        Review of Systems   Constitutional: Negative.    HENT: Negative.     Respiratory: Negative.     Gastrointestinal: Negative.    Musculoskeletal: Negative.    Skin: Negative.    Neurological: Negative.    Psychiatric/Behavioral: Negative.         ObjectiveVitals Reviewed via facility EMR   Physical Exam  Vitals and nursing note reviewed.   Constitutional:       General: He is not in acute distress.     Appearance: Normal appearance.   HENT:      Mouth/Throat:      Mouth: Mucous membranes are moist.      Pharynx: Oropharynx is clear. No oropharyngeal exudate.   Eyes:      Extraocular Movements: Extraocular movements intact.      Pupils: Pupils are equal, round, and reactive to light.   Cardiovascular:      Rate and Rhythm: Normal rate and regular rhythm.      Pulses: Normal pulses.      Heart sounds: Normal heart sounds. No murmur heard.  Pulmonary:      Effort: Pulmonary effort is normal. No respiratory distress.      Breath sounds: Wheezing present.      Comments: Mild wheezing at baseline  Abdominal:      General: Abdomen is flat. Bowel sounds are normal. There is no distension.      Tenderness: There is no abdominal tenderness.   Musculoskeletal:         General: Normal range of motion.      Right lower leg: No edema.      Left lower leg: No edema.   Skin:     General: Skin is warm and dry.      Capillary Refill: Capillary refill takes less than 2 seconds.   Neurological:      General: No focal deficit present.      Mental Status: He is alert. Mental status is at baseline.      Cranial Nerves: No cranial nerve deficit.      Motor: No weakness.       Comments: Weakness at baseline   Psychiatric:         Mood and Affect: Mood normal.         Thought Content: Thought content normal.         Assessment/Plan  Diagnoses and all orders for this visit:  Cerebrovascular accident (CVA) due to stenosis of cerebral artery (CMS/[facility])  Hyperlipidemia, unspecified hyperlipidemia type  Impairment of balance  Low back pain, unspecified back pain laterality, unspecified chronicity, unspecified whether sciatica present  High level of cardiac marker  Goals of care, counseling/discussion  Stage 3 chronic kidney disease, unspecified whether stage 3a or 3b CKD (CMS/[facility])      pt seen and examined, overall medically stable, discussed with POA that patient may be at his current baseline and that therapy may not provide much additional benefit. Will need additional resources at home, discuss with SW. Patient is at high risk of rehospitalization due to multiple medical comorbidities and difficulties with ADLs and patients POA voices understanding. Continue PT/OT, dispo planning, GOC conversations    >30 minutes spent in management of pt      Electronically Signed By: Alex Beebe DO   6/22/23  9:50 PM

## 2023-06-22 ASSESSMENT — ENCOUNTER SYMPTOMS
RESPIRATORY NEGATIVE: 1
PSYCHIATRIC NEGATIVE: 1
GASTROINTESTINAL NEGATIVE: 1
CONSTITUTIONAL NEGATIVE: 1
NEUROLOGICAL NEGATIVE: 1
MUSCULOSKELETAL NEGATIVE: 1

## 2023-06-23 ENCOUNTER — NURSING HOME VISIT (OUTPATIENT)
Dept: POST ACUTE CARE | Facility: EXTERNAL LOCATION | Age: 88
End: 2023-06-23
Payer: MEDICARE

## 2023-06-23 DIAGNOSIS — R31.0 GROSS HEMATURIA: Primary | ICD-10-CM

## 2023-06-23 DIAGNOSIS — R53.1 RIGHT SIDED WEAKNESS: ICD-10-CM

## 2023-06-23 DIAGNOSIS — I63.50 CEREBROVASCULAR ACCIDENT (CVA) DUE TO STENOSIS OF CEREBRAL ARTERY (MULTI): ICD-10-CM

## 2023-06-23 DIAGNOSIS — R13.19 OTHER DYSPHAGIA: ICD-10-CM

## 2023-06-23 DIAGNOSIS — N18.30 STAGE 3 CHRONIC KIDNEY DISEASE, UNSPECIFIED WHETHER STAGE 3A OR 3B CKD (MULTI): ICD-10-CM

## 2023-06-23 DIAGNOSIS — I25.119 CORONARY ARTERY DISEASE INVOLVING NATIVE CORONARY ARTERY OF NATIVE HEART WITH ANGINA PECTORIS (CMS-HCC): ICD-10-CM

## 2023-06-23 DIAGNOSIS — I10 PRIMARY HYPERTENSION: ICD-10-CM

## 2023-06-23 PROCEDURE — 99308 SBSQ NF CARE LOW MDM 20: CPT | Performed by: STUDENT IN AN ORGANIZED HEALTH CARE EDUCATION/TRAINING PROGRAM

## 2023-06-23 NOTE — PROGRESS NOTES
Subjective   Patient ID: Dean Florian is a 89 y.o. male.    PT seen and evaluated, daughter is at bedside as well. Patient has had minimal improvement with therapy. Is off oxygen, daughter is wondering about overall prognosis. Still having difficulty swallowing which is baseline. No additional issues.        Review of Systems   Constitutional: Negative.    HENT: Negative.     Respiratory: Negative.     Gastrointestinal: Negative.    Musculoskeletal: Negative.    Skin: Negative.    Neurological: Negative.    Psychiatric/Behavioral: Negative.         Objective Vitals Reviewed via facility EMR   Physical Exam  Vitals and nursing note reviewed.   Constitutional:       General: He is not in acute distress.     Appearance: Normal appearance.   HENT:      Mouth/Throat:      Mouth: Mucous membranes are moist.      Pharynx: Oropharynx is clear. No oropharyngeal exudate.   Eyes:      Extraocular Movements: Extraocular movements intact.      Pupils: Pupils are equal, round, and reactive to light.   Cardiovascular:      Rate and Rhythm: Normal rate and regular rhythm.      Pulses: Normal pulses.      Heart sounds: Normal heart sounds. No murmur heard.  Pulmonary:      Effort: Pulmonary effort is normal. No respiratory distress.      Breath sounds: Wheezing present.      Comments: Mild wheezing at baseline  Abdominal:      General: Abdomen is flat. Bowel sounds are normal. There is no distension.      Tenderness: There is no abdominal tenderness.   Musculoskeletal:         General: Normal range of motion.      Right lower leg: No edema.      Left lower leg: No edema.   Skin:     General: Skin is warm and dry.      Capillary Refill: Capillary refill takes less than 2 seconds.   Neurological:      General: No focal deficit present.      Mental Status: He is alert. Mental status is at baseline.      Cranial Nerves: No cranial nerve deficit.      Motor: No weakness.      Comments: Weakness at baseline   Psychiatric:         Mood and  Affect: Mood normal.         Thought Content: Thought content normal.         Assessment/Plan   Diagnoses and all orders for this visit:  Cerebrovascular accident (CVA) due to stenosis of cerebral artery (CMS/[facility])  Hyperlipidemia, unspecified hyperlipidemia type  Impairment of balance  Low back pain, unspecified back pain laterality, unspecified chronicity, unspecified whether sciatica present  High level of cardiac marker  Goals of care, counseling/discussion  Stage 3 chronic kidney disease, unspecified whether stage 3a or 3b CKD (CMS/[facility])      pt seen and examined, overall medically stable, discussed with POA that patient may be at his current baseline and that therapy may not provide much additional benefit. Will need additional resources at home, discuss with SW. Patient is at high risk of rehospitalization due to multiple medical comorbidities and difficulties with ADLs and patients POA voices understanding. Continue PT/OT, dispo planning, GOC conversations    >30 minutes spent in management of pt

## 2023-06-23 NOTE — LETTER
Patient: Dean Florian  : 1926    Encounter Date: 2023    Subjective  Patient ID: Dean Florian is a 89 y.o. male.    Pt seen at bedside. Was trying to get out of bed this morning. Patient also has noted hematuria coming from his suárez, however, was seen tugging at suárez. Reoriented patient and placed back into bed as he is high fall precautions. Reports no additional issues.        Review of Systems   Reason unable to perform ROS: unable to fully obtain due to baseline status.   Constitutional: Negative.    HENT: Negative.     Respiratory: Negative.     Cardiovascular: Negative.    Gastrointestinal: Negative.    Musculoskeletal: Negative.    Neurological: Negative.    Psychiatric/Behavioral: Negative.         ObjectiveVitals Reviewed via facility EMR   Physical Exam  Vitals and nursing note reviewed.   Constitutional:       General: He is not in acute distress.     Appearance: Normal appearance.   HENT:      Mouth/Throat:      Mouth: Mucous membranes are moist.      Pharynx: Oropharynx is clear. No oropharyngeal exudate.   Eyes:      Extraocular Movements: Extraocular movements intact.      Pupils: Pupils are equal, round, and reactive to light.   Cardiovascular:      Rate and Rhythm: Normal rate and regular rhythm.      Pulses: Normal pulses.      Heart sounds: Normal heart sounds. No murmur heard.  Pulmonary:      Effort: Pulmonary effort is normal. No respiratory distress.   Abdominal:      General: Abdomen is flat. Bowel sounds are normal. There is no distension.      Tenderness: There is no abdominal tenderness.   Musculoskeletal:         General: Normal range of motion.      Right lower leg: No edema.      Left lower leg: No edema.      Comments: R sided weakness at baseline   Skin:     General: Skin is warm and dry.      Capillary Refill: Capillary refill takes less than 2 seconds.   Neurological:      General: No focal deficit present.      Mental Status: He is alert. Mental status is at baseline.       Cranial Nerves: No cranial nerve deficit.      Motor: No weakness.   Psychiatric:         Mood and Affect: Mood normal.         Thought Content: Thought content normal.         Assessment/Plan  Diagnoses and all orders for this visit:  Gross hematuria  Stage 3 chronic kidney disease, unspecified whether stage 3a or 3b CKD (CMS/HCC)  Other dysphagia  Primary hypertension  Coronary artery disease involving native coronary artery of native heart with angina pectoris (CMS/HCC)  Cerebrovascular accident (CVA) due to stenosis of cerebral artery (CMS/HCC)  Right sided weakness    Check CBC and UA due to hematuria, suspect trauma to suárez that is causing hematuria, unable to remove due to urinary retention, family updated, continue PT/OT, GOC, likely would benefit from palliative follow up      Electronically Signed By: Alex Beebe DO   6/24/23  1:46 PM

## 2023-06-24 PROBLEM — R31.0 GROSS HEMATURIA: Status: ACTIVE | Noted: 2023-06-24

## 2023-06-24 ASSESSMENT — ENCOUNTER SYMPTOMS
NEUROLOGICAL NEGATIVE: 1
GASTROINTESTINAL NEGATIVE: 1
CONSTITUTIONAL NEGATIVE: 1
PSYCHIATRIC NEGATIVE: 1
CARDIOVASCULAR NEGATIVE: 1
RESPIRATORY NEGATIVE: 1
MUSCULOSKELETAL NEGATIVE: 1

## 2023-06-24 NOTE — PROGRESS NOTES
Subjective   Patient ID: Dean Florian is a 89 y.o. male.    Pt seen at bedside. Was trying to get out of bed this morning. Patient also has noted hematuria coming from his suárez, however, was seen tugging at suárez. Reoriented patient and placed back into bed as he is high fall precautions. Reports no additional issues.        Review of Systems   Reason unable to perform ROS: unable to fully obtain due to baseline status.   Constitutional: Negative.    HENT: Negative.     Respiratory: Negative.     Cardiovascular: Negative.    Gastrointestinal: Negative.    Musculoskeletal: Negative.    Neurological: Negative.    Psychiatric/Behavioral: Negative.         Objective Vitals Reviewed via facility EMR   Physical Exam  Vitals and nursing note reviewed.   Constitutional:       General: He is not in acute distress.     Appearance: Normal appearance.   HENT:      Mouth/Throat:      Mouth: Mucous membranes are moist.      Pharynx: Oropharynx is clear. No oropharyngeal exudate.   Eyes:      Extraocular Movements: Extraocular movements intact.      Pupils: Pupils are equal, round, and reactive to light.   Cardiovascular:      Rate and Rhythm: Normal rate and regular rhythm.      Pulses: Normal pulses.      Heart sounds: Normal heart sounds. No murmur heard.  Pulmonary:      Effort: Pulmonary effort is normal. No respiratory distress.   Abdominal:      General: Abdomen is flat. Bowel sounds are normal. There is no distension.      Tenderness: There is no abdominal tenderness.   Musculoskeletal:         General: Normal range of motion.      Right lower leg: No edema.      Left lower leg: No edema.      Comments: R sided weakness at baseline   Skin:     General: Skin is warm and dry.      Capillary Refill: Capillary refill takes less than 2 seconds.   Neurological:      General: No focal deficit present.      Mental Status: He is alert. Mental status is at baseline.      Cranial Nerves: No cranial nerve deficit.      Motor: No  weakness.   Psychiatric:         Mood and Affect: Mood normal.         Thought Content: Thought content normal.         Assessment/Plan   Diagnoses and all orders for this visit:  Gross hematuria  Stage 3 chronic kidney disease, unspecified whether stage 3a or 3b CKD (CMS/HCC)  Other dysphagia  Primary hypertension  Coronary artery disease involving native coronary artery of native heart with angina pectoris (CMS/HCC)  Cerebrovascular accident (CVA) due to stenosis of cerebral artery (CMS/HCC)  Right sided weakness    Check CBC and UA due to hematuria, suspect trauma to suárez that is causing hematuria, unable to remove due to urinary retention, family updated, continue PT/OT, GOC, likely would benefit from palliative follow up

## 2023-06-28 ENCOUNTER — NURSING HOME VISIT (OUTPATIENT)
Dept: POST ACUTE CARE | Facility: EXTERNAL LOCATION | Age: 88
End: 2023-06-28

## 2023-06-28 ENCOUNTER — NURSING HOME VISIT (OUTPATIENT)
Dept: POST ACUTE CARE | Facility: EXTERNAL LOCATION | Age: 88
End: 2023-06-28
Payer: MEDICARE

## 2023-06-28 DIAGNOSIS — I48.0 PAROXYSMAL ATRIAL FIBRILLATION (MULTI): ICD-10-CM

## 2023-06-28 DIAGNOSIS — I48.20 CHRONIC ATRIAL FIBRILLATION (MULTI): ICD-10-CM

## 2023-06-28 DIAGNOSIS — E78.5 HYPERLIPIDEMIA, UNSPECIFIED HYPERLIPIDEMIA TYPE: ICD-10-CM

## 2023-06-28 DIAGNOSIS — Z71.89 GOALS OF CARE, COUNSELING/DISCUSSION: Primary | ICD-10-CM

## 2023-06-28 DIAGNOSIS — R33.8 ACUTE URINARY RETENTION: ICD-10-CM

## 2023-06-28 DIAGNOSIS — R31.0 GROSS HEMATURIA: ICD-10-CM

## 2023-06-28 DIAGNOSIS — N18.30 STAGE 3 CHRONIC KIDNEY DISEASE, UNSPECIFIED WHETHER STAGE 3A OR 3B CKD (MULTI): ICD-10-CM

## 2023-06-28 DIAGNOSIS — Z95.818 PRESENCE OF WATCHMAN LEFT ATRIAL APPENDAGE CLOSURE DEVICE: ICD-10-CM

## 2023-06-28 DIAGNOSIS — M21.371 RIGHT FOOT DROP: ICD-10-CM

## 2023-06-28 DIAGNOSIS — R26.89 IMPAIRMENT OF BALANCE: ICD-10-CM

## 2023-06-28 DIAGNOSIS — F33.9 DEPRESSION, RECURRENT (CMS-HCC): ICD-10-CM

## 2023-06-28 PROCEDURE — 99308 SBSQ NF CARE LOW MDM 20: CPT | Performed by: STUDENT IN AN ORGANIZED HEALTH CARE EDUCATION/TRAINING PROGRAM

## 2023-06-28 ASSESSMENT — ENCOUNTER SYMPTOMS
CONSTITUTIONAL NEGATIVE: 1
NEUROLOGICAL NEGATIVE: 1
DIFFICULTY URINATING: 0
CARDIOVASCULAR NEGATIVE: 1
PSYCHIATRIC NEGATIVE: 1
MUSCULOSKELETAL NEGATIVE: 1
GASTROINTESTINAL NEGATIVE: 1
RESPIRATORY NEGATIVE: 1
HEMATURIA: 1

## 2023-06-28 NOTE — LETTER
Patient: Dean Florian  : 1926    Encounter Date: 2023    Subjective  Patient ID: Dean Florian is a 89 y.o. male.    Pt seen and examined in bedside chair. Is minimally improving with therapy but starting to gain strength back. Is not walking yet, but getting better with transfers. Still having difficulty with swallowing. Daughter worried about discharge planning and resources going home and is considering long term care in the future. Worried about dementia precautions moving forward.         Review of Systems   Constitutional: Negative.    HENT: Negative.     Respiratory: Negative.     Cardiovascular: Negative.    Gastrointestinal: Negative.    Genitourinary:  Positive for hematuria. Negative for difficulty urinating.   Musculoskeletal: Negative.    Neurological: Negative.    Psychiatric/Behavioral: Negative.         ObjectiveVitals Reviewed via facility EMR   Physical Exam  Vitals and nursing note reviewed.   Constitutional:       General: He is not in acute distress.     Appearance: Normal appearance.   HENT:      Mouth/Throat:      Mouth: Mucous membranes are moist.      Pharynx: Oropharynx is clear. No oropharyngeal exudate.   Eyes:      Extraocular Movements: Extraocular movements intact.      Pupils: Pupils are equal, round, and reactive to light.   Cardiovascular:      Rate and Rhythm: Normal rate and regular rhythm.      Pulses: Normal pulses.      Heart sounds: Normal heart sounds. No murmur heard.  Pulmonary:      Effort: Pulmonary effort is normal. No respiratory distress.   Abdominal:      General: Abdomen is flat. Bowel sounds are normal. There is no distension.      Tenderness: There is no abdominal tenderness.   Genitourinary:     Comments: Blood tinged urine in suárez  Musculoskeletal:         General: Normal range of motion.      Right lower leg: No edema.      Left lower leg: No edema.      Comments: R sided weakness at baseline   Skin:     General: Skin is warm and dry.      Capillary  Refill: Capillary refill takes less than 2 seconds.   Neurological:      General: No focal deficit present.      Mental Status: He is alert. Mental status is at baseline.      Cranial Nerves: No cranial nerve deficit.      Motor: No weakness.   Psychiatric:         Mood and Affect: Mood normal.         Thought Content: Thought content normal.         Assessment/Plan  Diagnoses and all orders for this visit:  Goals of care, counseling/discussion  Chronic atrial fibrillation (CMS/HCC)  Hyperlipidemia, unspecified hyperlipidemia type  Paroxysmal atrial fibrillation (CMS/HCC)  Acute urinary retention  Gross hematuria  Stage 3 chronic kidney disease, unspecified whether stage 3a or 3b CKD (CMS/[facility])  Impairment of balance    Pt seen and examined, overall medically stable and having minimal improvements with therapy at this time. Discussed discharge planning with daughter, likely would be a good candidate for long term care due to multiple medical needs and assistance for ADLs. Discussed delirium and dementia precautions such as good sleep wake cycle, avoiding sedating medications and promoting a familiar environment. Continue supportive care, dispo planning.      Electronically Signed By: Alex Beebe DO   6/28/23  9:30 PM

## 2023-06-28 NOTE — LETTER
Patient: Dean Florian  : 1926    Encounter Date: 2023    Subjective  Patient ID: Dean Florian is a 89 y.o. male.    Patient seen resting in bed. Strength is overall improving. He regards no concerns and feels well. States no additional issues.        Review of Systems   Constitutional: Negative.    HENT: Negative.     Respiratory: Negative.     Gastrointestinal: Negative.    Musculoskeletal: Negative.    Skin: Negative.    Neurological: Negative.    Psychiatric/Behavioral: Negative.         Objective  Vitals Reviewed via facility EMR   Physical Exam  Vitals and nursing note reviewed.   Constitutional:       General: He is not in acute distress.     Appearance: Normal appearance.   HENT:      Mouth/Throat:      Mouth: Mucous membranes are moist.      Pharynx: Oropharynx is clear. No oropharyngeal exudate.   Eyes:      Extraocular Movements: Extraocular movements intact.      Pupils: Pupils are equal, round, and reactive to light.   Cardiovascular:      Rate and Rhythm: Normal rate and regular rhythm.      Pulses: Normal pulses.      Heart sounds: Normal heart sounds. No murmur heard.  Pulmonary:      Effort: Pulmonary effort is normal. No respiratory distress.   Abdominal:      General: Abdomen is flat. Bowel sounds are normal. There is no distension.      Tenderness: There is no abdominal tenderness.   Musculoskeletal:         General: Normal range of motion.      Right lower leg: No edema.      Left lower leg: No edema.   Skin:     General: Skin is warm and dry.      Capillary Refill: Capillary refill takes less than 2 seconds.   Neurological:      General: No focal deficit present.      Mental Status: He is alert. Mental status is at baseline.      Cranial Nerves: No cranial nerve deficit.      Motor: No weakness.      Comments: R sided weakness, baseline   Psychiatric:         Mood and Affect: Mood normal.         Thought Content: Thought content normal.         Assessment/Plan  Diagnoses and all  orders for this visit:  Goals of care, counseling/discussion  Chronic atrial fibrillation (CMS/[facility])  Hyperlipidemia, unspecified hyperlipidemia type  Presence of Watchman left atrial appendage closure device  Paroxysmal atrial fibrillation (CMS/[facility])  Stage 3 chronic kidney disease, unspecified whether stage 3a or 3b CKD (CMS/[facility])  Depression, recurrent (CMS/[facility])  Impairment of balance  Right foot drop    PT seen and examined, overall medically stable, encourage aspiration precautions, continue PT/OT, patient likely near his maximum capacity, medically stable for discharge         Electronically Signed By: Alex Beebe DO   6/30/23  8:22 PM

## 2023-06-29 NOTE — PROGRESS NOTES
Subjective   Patient ID: Dean Florian is a 89 y.o. male.    Pt seen and examined in bedside chair. Is minimally improving with therapy but starting to gain strength back. Is not walking yet, but getting better with transfers. Still having difficulty with swallowing. Daughter worried about discharge planning and resources going home and is considering long term care in the future. Worried about dementia precautions moving forward.         Review of Systems   Constitutional: Negative.    HENT: Negative.     Respiratory: Negative.     Cardiovascular: Negative.    Gastrointestinal: Negative.    Genitourinary:  Positive for hematuria. Negative for difficulty urinating.   Musculoskeletal: Negative.    Neurological: Negative.    Psychiatric/Behavioral: Negative.         Objective Vitals Reviewed via facility EMR   Physical Exam  Vitals and nursing note reviewed.   Constitutional:       General: He is not in acute distress.     Appearance: Normal appearance.   HENT:      Mouth/Throat:      Mouth: Mucous membranes are moist.      Pharynx: Oropharynx is clear. No oropharyngeal exudate.   Eyes:      Extraocular Movements: Extraocular movements intact.      Pupils: Pupils are equal, round, and reactive to light.   Cardiovascular:      Rate and Rhythm: Normal rate and regular rhythm.      Pulses: Normal pulses.      Heart sounds: Normal heart sounds. No murmur heard.  Pulmonary:      Effort: Pulmonary effort is normal. No respiratory distress.   Abdominal:      General: Abdomen is flat. Bowel sounds are normal. There is no distension.      Tenderness: There is no abdominal tenderness.   Genitourinary:     Comments: Blood tinged urine in suárez  Musculoskeletal:         General: Normal range of motion.      Right lower leg: No edema.      Left lower leg: No edema.      Comments: R sided weakness at baseline   Skin:     General: Skin is warm and dry.      Capillary Refill: Capillary refill takes less than 2 seconds.   Neurological:       General: No focal deficit present.      Mental Status: He is alert. Mental status is at baseline.      Cranial Nerves: No cranial nerve deficit.      Motor: No weakness.   Psychiatric:         Mood and Affect: Mood normal.         Thought Content: Thought content normal.         Assessment/Plan   Diagnoses and all orders for this visit:  Goals of care, counseling/discussion  Chronic atrial fibrillation (CMS/[facility])  Hyperlipidemia, unspecified hyperlipidemia type  Paroxysmal atrial fibrillation (CMS/[facility])  Acute urinary retention  Gross hematuria  Stage 3 chronic kidney disease, unspecified whether stage 3a or 3b CKD (CMS/[facility])  Impairment of balance    Pt seen and examined, overall medically stable and having minimal improvements with therapy at this time. Discussed discharge planning with daughter, likely would be a good candidate for long term care due to multiple medical needs and assistance for ADLs. Discussed delirium and dementia precautions such as good sleep wake cycle, avoiding sedating medications and promoting a familiar environment. Continue supportive care, dispo planning.

## 2023-06-30 ASSESSMENT — ENCOUNTER SYMPTOMS
CONSTITUTIONAL NEGATIVE: 1
RESPIRATORY NEGATIVE: 1
GASTROINTESTINAL NEGATIVE: 1
MUSCULOSKELETAL NEGATIVE: 1
NEUROLOGICAL NEGATIVE: 1
PSYCHIATRIC NEGATIVE: 1

## 2023-07-01 NOTE — PROGRESS NOTES
Subjective   Patient ID: Dean Florian is a 89 y.o. male.    Patient seen resting in bed. Strength is overall improving. He regards no concerns and feels well. States no additional issues.        Review of Systems   Constitutional: Negative.    HENT: Negative.     Respiratory: Negative.     Gastrointestinal: Negative.    Musculoskeletal: Negative.    Skin: Negative.    Neurological: Negative.    Psychiatric/Behavioral: Negative.         Objective   Vitals Reviewed via facility EMR   Physical Exam  Vitals and nursing note reviewed.   Constitutional:       General: He is not in acute distress.     Appearance: Normal appearance.   HENT:      Mouth/Throat:      Mouth: Mucous membranes are moist.      Pharynx: Oropharynx is clear. No oropharyngeal exudate.   Eyes:      Extraocular Movements: Extraocular movements intact.      Pupils: Pupils are equal, round, and reactive to light.   Cardiovascular:      Rate and Rhythm: Normal rate and regular rhythm.      Pulses: Normal pulses.      Heart sounds: Normal heart sounds. No murmur heard.  Pulmonary:      Effort: Pulmonary effort is normal. No respiratory distress.   Abdominal:      General: Abdomen is flat. Bowel sounds are normal. There is no distension.      Tenderness: There is no abdominal tenderness.   Musculoskeletal:         General: Normal range of motion.      Right lower leg: No edema.      Left lower leg: No edema.   Skin:     General: Skin is warm and dry.      Capillary Refill: Capillary refill takes less than 2 seconds.   Neurological:      General: No focal deficit present.      Mental Status: He is alert. Mental status is at baseline.      Cranial Nerves: No cranial nerve deficit.      Motor: No weakness.      Comments: R sided weakness, baseline   Psychiatric:         Mood and Affect: Mood normal.         Thought Content: Thought content normal.         Assessment/Plan   Diagnoses and all orders for this visit:  Goals of care, counseling/discussion  Chronic  atrial fibrillation (CMS/[facility])  Hyperlipidemia, unspecified hyperlipidemia type  Presence of Watchman left atrial appendage closure device  Paroxysmal atrial fibrillation (CMS/[facility])  Stage 3 chronic kidney disease, unspecified whether stage 3a or 3b CKD (CMS/[facility])  Depression, recurrent (CMS/[facility])  Impairment of balance  Right foot drop    PT seen and examined, overall medically stable, encourage aspiration precautions, continue PT/OT, patient likely near his maximum capacity, medically stable for discharge

## 2023-07-05 ENCOUNTER — NURSING HOME VISIT (OUTPATIENT)
Dept: POST ACUTE CARE | Facility: EXTERNAL LOCATION | Age: 88
End: 2023-07-05
Payer: MEDICARE

## 2023-07-05 DIAGNOSIS — I48.0 PAROXYSMAL ATRIAL FIBRILLATION (MULTI): ICD-10-CM

## 2023-07-05 DIAGNOSIS — Z71.89 GOALS OF CARE, COUNSELING/DISCUSSION: ICD-10-CM

## 2023-07-05 DIAGNOSIS — R26.89 IMPAIRMENT OF BALANCE: ICD-10-CM

## 2023-07-05 DIAGNOSIS — I48.20 CHRONIC ATRIAL FIBRILLATION (MULTI): ICD-10-CM

## 2023-07-05 DIAGNOSIS — R53.1 RIGHT SIDED WEAKNESS: ICD-10-CM

## 2023-07-05 DIAGNOSIS — I10 PRIMARY HYPERTENSION: Primary | ICD-10-CM

## 2023-07-05 PROCEDURE — 99308 SBSQ NF CARE LOW MDM 20: CPT | Performed by: STUDENT IN AN ORGANIZED HEALTH CARE EDUCATION/TRAINING PROGRAM

## 2023-07-05 ASSESSMENT — ENCOUNTER SYMPTOMS
NEUROLOGICAL NEGATIVE: 1
CONSTITUTIONAL NEGATIVE: 1
RESPIRATORY NEGATIVE: 1
PSYCHIATRIC NEGATIVE: 1
MUSCULOSKELETAL NEGATIVE: 1
GASTROINTESTINAL NEGATIVE: 1
CARDIOVASCULAR NEGATIVE: 1

## 2023-07-05 NOTE — LETTER
Patient: Dean Florian  : 1926    Encounter Date: 2023    Subjective  Patient ID: Dean Florian is a 89 y.o. male.    Patient seen and examined, overall medically stable. Is improving slightly with PT but still unable to walk. Has likely reached peak of therapy threshold. Continue current medications, overall medicallys table.        Review of Systems   Constitutional: Negative.    HENT: Negative.     Respiratory: Negative.     Cardiovascular: Negative.    Gastrointestinal: Negative.    Musculoskeletal: Negative.    Skin: Negative.    Neurological: Negative.    Psychiatric/Behavioral: Negative.         ObjectiveVitals Reviewed via facility EMR   Physical Exam  Vitals and nursing note reviewed.   Constitutional:       General: He is not in acute distress.     Appearance: Normal appearance.   HENT:      Mouth/Throat:      Mouth: Mucous membranes are moist.      Pharynx: Oropharynx is clear. No oropharyngeal exudate.   Eyes:      Extraocular Movements: Extraocular movements intact.      Pupils: Pupils are equal, round, and reactive to light.   Cardiovascular:      Rate and Rhythm: Normal rate and regular rhythm.      Pulses: Normal pulses.      Heart sounds: Normal heart sounds. No murmur heard.  Pulmonary:      Effort: Pulmonary effort is normal. No respiratory distress.   Abdominal:      General: Abdomen is flat. Bowel sounds are normal. There is no distension.      Tenderness: There is no abdominal tenderness.   Musculoskeletal:         General: Normal range of motion.      Right lower leg: No edema.      Left lower leg: No edema.      Comments: Baseline right sided weakness   Skin:     General: Skin is warm and dry.      Capillary Refill: Capillary refill takes less than 2 seconds.   Neurological:      General: No focal deficit present.      Mental Status: He is alert. Mental status is at baseline.      Cranial Nerves: No cranial nerve deficit.      Motor: No weakness.   Psychiatric:         Mood and  Affect: Mood normal.         Thought Content: Thought content normal.         Assessment/Plan  Diagnoses and all orders for this visit:  Primary hypertension  Paroxysmal atrial fibrillation (CMS/HCC)  Chronic atrial fibrillation (CMS/HCC)  Goals of care, counseling/discussion  Impairment of balance  Right sided weakness    Pt seen and examined, overall medically stable, continue PT/OT however likely has reached peak of therapy level. Supportive care. Labs reviewed and overall stable        Electronically Signed By: Alex Beebe DO   7/5/23  9:21 PM

## 2023-07-06 NOTE — PROGRESS NOTES
Subjective   Patient ID: Dean Florian is a 89 y.o. male.    Patient seen and examined, overall medically stable. Is improving slightly with PT but still unable to walk. Has likely reached peak of therapy threshold. Continue current medications, overall medicallys table.        Review of Systems   Constitutional: Negative.    HENT: Negative.     Respiratory: Negative.     Cardiovascular: Negative.    Gastrointestinal: Negative.    Musculoskeletal: Negative.    Skin: Negative.    Neurological: Negative.    Psychiatric/Behavioral: Negative.         Objective Vitals Reviewed via facility EMR   Physical Exam  Vitals and nursing note reviewed.   Constitutional:       General: He is not in acute distress.     Appearance: Normal appearance.   HENT:      Mouth/Throat:      Mouth: Mucous membranes are moist.      Pharynx: Oropharynx is clear. No oropharyngeal exudate.   Eyes:      Extraocular Movements: Extraocular movements intact.      Pupils: Pupils are equal, round, and reactive to light.   Cardiovascular:      Rate and Rhythm: Normal rate and regular rhythm.      Pulses: Normal pulses.      Heart sounds: Normal heart sounds. No murmur heard.  Pulmonary:      Effort: Pulmonary effort is normal. No respiratory distress.   Abdominal:      General: Abdomen is flat. Bowel sounds are normal. There is no distension.      Tenderness: There is no abdominal tenderness.   Musculoskeletal:         General: Normal range of motion.      Right lower leg: No edema.      Left lower leg: No edema.      Comments: Baseline right sided weakness   Skin:     General: Skin is warm and dry.      Capillary Refill: Capillary refill takes less than 2 seconds.   Neurological:      General: No focal deficit present.      Mental Status: He is alert. Mental status is at baseline.      Cranial Nerves: No cranial nerve deficit.      Motor: No weakness.   Psychiatric:         Mood and Affect: Mood normal.         Thought Content: Thought content normal.          Assessment/Plan   Diagnoses and all orders for this visit:  Primary hypertension  Paroxysmal atrial fibrillation (CMS/HCC)  Chronic atrial fibrillation (CMS/HCC)  Goals of care, counseling/discussion  Impairment of balance  Right sided weakness    Pt seen and examined, overall medically stable, continue PT/OT however likely has reached peak of therapy level. Supportive care. Labs reviewed and overall stable

## 2023-07-11 ENCOUNTER — NURSING HOME VISIT (OUTPATIENT)
Dept: POST ACUTE CARE | Facility: EXTERNAL LOCATION | Age: 88
End: 2023-07-11
Payer: MEDICARE

## 2023-07-11 DIAGNOSIS — R53.1 RIGHT SIDED WEAKNESS: ICD-10-CM

## 2023-07-11 DIAGNOSIS — M21.371 RIGHT FOOT DROP: ICD-10-CM

## 2023-07-11 DIAGNOSIS — I48.20 CHRONIC ATRIAL FIBRILLATION (MULTI): Primary | ICD-10-CM

## 2023-07-11 DIAGNOSIS — N18.30 STAGE 3 CHRONIC KIDNEY DISEASE, UNSPECIFIED WHETHER STAGE 3A OR 3B CKD (MULTI): ICD-10-CM

## 2023-07-11 DIAGNOSIS — I63.50 CEREBROVASCULAR ACCIDENT (CVA) DUE TO STENOSIS OF CEREBRAL ARTERY (MULTI): ICD-10-CM

## 2023-07-11 DIAGNOSIS — K21.9 GASTROESOPHAGEAL REFLUX DISEASE WITHOUT ESOPHAGITIS: ICD-10-CM

## 2023-07-11 DIAGNOSIS — R13.19 OTHER DYSPHAGIA: ICD-10-CM

## 2023-07-11 DIAGNOSIS — Z71.89 GOALS OF CARE, COUNSELING/DISCUSSION: ICD-10-CM

## 2023-07-11 DIAGNOSIS — R26.89 IMPAIRMENT OF BALANCE: ICD-10-CM

## 2023-07-11 PROCEDURE — 99309 SBSQ NF CARE MODERATE MDM 30: CPT | Performed by: STUDENT IN AN ORGANIZED HEALTH CARE EDUCATION/TRAINING PROGRAM

## 2023-07-11 ASSESSMENT — ENCOUNTER SYMPTOMS
CARDIOVASCULAR NEGATIVE: 1
MUSCULOSKELETAL NEGATIVE: 1
PSYCHIATRIC NEGATIVE: 1
RESPIRATORY NEGATIVE: 1
NEUROLOGICAL NEGATIVE: 1
GASTROINTESTINAL NEGATIVE: 1
CONSTITUTIONAL NEGATIVE: 1

## 2023-07-11 NOTE — LETTER
Patient: Dean Florian  : 1926    Encounter Date: 2023    Subjective  Patient ID: Dean Florian is a 89 y.o. male.    Patient seen and examined, overall medically stable. Has maximized therapy and family looking to take patient home. Patient still requires assistance and medical equipment to help with his mobility and needs in order to function with his ADLs. He regards no additional issues.        Review of Systems   Constitutional: Negative.    HENT: Negative.     Respiratory: Negative.     Cardiovascular: Negative.    Gastrointestinal: Negative.    Musculoskeletal: Negative.    Skin: Negative.    Neurological: Negative.    Psychiatric/Behavioral: Negative.         ObjectiveVitals Reviewed via facility EMR   Physical Exam  Vitals and nursing note reviewed.   Constitutional:       General: He is not in acute distress.     Appearance: Normal appearance.   HENT:      Mouth/Throat:      Mouth: Mucous membranes are moist.      Pharynx: Oropharynx is clear. No oropharyngeal exudate.   Eyes:      Extraocular Movements: Extraocular movements intact.      Pupils: Pupils are equal, round, and reactive to light.   Cardiovascular:      Rate and Rhythm: Normal rate and regular rhythm.      Pulses: Normal pulses.      Heart sounds: Normal heart sounds. No murmur heard.  Pulmonary:      Effort: Pulmonary effort is normal. No respiratory distress.   Abdominal:      General: Abdomen is flat. Bowel sounds are normal. There is no distension.      Tenderness: There is no abdominal tenderness.   Musculoskeletal:         General: Normal range of motion.      Right lower leg: No edema.      Left lower leg: No edema.   Skin:     General: Skin is warm and dry.      Capillary Refill: Capillary refill takes less than 2 seconds.   Neurological:      General: No focal deficit present.      Mental Status: He is alert. Mental status is at baseline.      Cranial Nerves: No cranial nerve deficit.      Motor: No weakness.      Comments:  R sided weakness at baseline, strength at baseline   Psychiatric:         Mood and Affect: Mood normal.         Thought Content: Thought content normal.         Assessment/Plan  Diagnoses and all orders for this visit:  Chronic atrial fibrillation (CMS/[facility])  Goals of care, counseling/discussion  Gastroesophageal reflux disease without esophagitis  Other dysphagia  Stage 3 chronic kidney disease, unspecified whether stage 3a or 3b CKD (CMS/[facility])  Cerebrovascular accident (CVA) due to stenosis of cerebral artery (CMS/[facility])  Impairment of balance  Right sided weakness  Right foot drop      Pt overall medically stable, dispo planning underway as patient has likely maximized therapy. On discharge the patient will require multiple needs due to multiple comorbidities and issues with mobility  Patient will require a hari lift on discharge to transfer resident from bed to wheelchair and wheelchair to commode and without this the patient would be bed confined.  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 The patient is able to self propel himself and has not shown that he is not willing to use wheelchair and does have a house that has wide enough hallways to facilitate a wheelchair. In addition, the patient is not able to ambulate with a cane or walker  Finally due to diagnosis of above, patient will need a hospital bed because he requires frequent body position changes and has immediate need for a change in body position due to above diagnosis.    >30 minutes spent in management of pt      Electronically Signed By: Alex Beebe DO   7/11/23 10:07 PM

## 2023-07-12 NOTE — PROGRESS NOTES
Subjective   Patient ID: Dean Florian is a 89 y.o. male.    Patient seen and examined, overall medically stable. Has maximized therapy and family looking to take patient home. Patient still requires assistance and medical equipment to help with his mobility and needs in order to function with his ADLs. He regards no additional issues.        Review of Systems   Constitutional: Negative.    HENT: Negative.     Respiratory: Negative.     Cardiovascular: Negative.    Gastrointestinal: Negative.    Musculoskeletal: Negative.    Skin: Negative.    Neurological: Negative.    Psychiatric/Behavioral: Negative.         Objective Vitals Reviewed via facility EMR   Physical Exam  Vitals and nursing note reviewed.   Constitutional:       General: He is not in acute distress.     Appearance: Normal appearance.   HENT:      Mouth/Throat:      Mouth: Mucous membranes are moist.      Pharynx: Oropharynx is clear. No oropharyngeal exudate.   Eyes:      Extraocular Movements: Extraocular movements intact.      Pupils: Pupils are equal, round, and reactive to light.   Cardiovascular:      Rate and Rhythm: Normal rate and regular rhythm.      Pulses: Normal pulses.      Heart sounds: Normal heart sounds. No murmur heard.  Pulmonary:      Effort: Pulmonary effort is normal. No respiratory distress.   Abdominal:      General: Abdomen is flat. Bowel sounds are normal. There is no distension.      Tenderness: There is no abdominal tenderness.   Musculoskeletal:         General: Normal range of motion.      Right lower leg: No edema.      Left lower leg: No edema.   Skin:     General: Skin is warm and dry.      Capillary Refill: Capillary refill takes less than 2 seconds.   Neurological:      General: No focal deficit present.      Mental Status: He is alert. Mental status is at baseline.      Cranial Nerves: No cranial nerve deficit.      Motor: No weakness.      Comments: R sided weakness at baseline, strength at baseline   Psychiatric:          Mood and Affect: Mood normal.         Thought Content: Thought content normal.         Assessment/Plan   Diagnoses and all orders for this visit:  Chronic atrial fibrillation (CMS/[facility])  Goals of care, counseling/discussion  Gastroesophageal reflux disease without esophagitis  Other dysphagia  Stage 3 chronic kidney disease, unspecified whether stage 3a or 3b CKD (CMS/[facility])  Cerebrovascular accident (CVA) due to stenosis of cerebral artery (CMS/[facility])  Impairment of balance  Right sided weakness  Right foot drop      Pt overall medically stable, dispo planning underway as patient has likely maximized therapy. On discharge the patient will require multiple needs due to multiple comorbidities and issues with mobility  Patient will require a hari lift on discharge to transfer resident from bed to wheelchair and wheelchair to commode and without this the patient would be bed confined.  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 The patient is able to self propel himself and has not shown that he is not willing to use wheelchair and does have a house that has wide enough hallways to facilitate a wheelchair. In addition, the patient is not able to ambulate with a cane or walker  Finally due to diagnosis of above, patient will need a hospital bed because he requires frequent body position changes and has immediate need for a change in body position due to above diagnosis.    >30 minutes spent in management of pt    Addendum:    7/14/23-patient requires chronic O2 at night and would require nebulizer due to chronic hypoxic respiratory failure due to frequent desaturations overnight while sleeping.  Prescription has been provided for this.

## 2023-07-14 ENCOUNTER — NURSING HOME VISIT (OUTPATIENT)
Dept: POST ACUTE CARE | Facility: EXTERNAL LOCATION | Age: 88
End: 2023-07-14
Payer: MEDICARE

## 2023-07-14 DIAGNOSIS — N18.31 STAGE 3A CHRONIC KIDNEY DISEASE (MULTI): ICD-10-CM

## 2023-07-14 DIAGNOSIS — K59.09 OTHER CONSTIPATION: ICD-10-CM

## 2023-07-14 DIAGNOSIS — F33.9 DEPRESSION, RECURRENT (CMS-HCC): ICD-10-CM

## 2023-07-14 DIAGNOSIS — E78.5 HYPERLIPIDEMIA, UNSPECIFIED HYPERLIPIDEMIA TYPE: Primary | ICD-10-CM

## 2023-07-14 DIAGNOSIS — J96.11 CHRONIC RESPIRATORY FAILURE WITH HYPOXIA (MULTI): ICD-10-CM

## 2023-07-14 DIAGNOSIS — I63.50 CEREBROVASCULAR ACCIDENT (CVA) DUE TO STENOSIS OF CEREBRAL ARTERY (MULTI): ICD-10-CM

## 2023-07-14 DIAGNOSIS — I48.20 CHRONIC ATRIAL FIBRILLATION (MULTI): ICD-10-CM

## 2023-07-14 PROCEDURE — 99308 SBSQ NF CARE LOW MDM 20: CPT | Performed by: STUDENT IN AN ORGANIZED HEALTH CARE EDUCATION/TRAINING PROGRAM

## 2023-07-14 NOTE — LETTER
Patient: Daen Florian  : 1926    Encounter Date: 2023    Subjective  Patient ID: Dean Florian is a 89 y.o. male.    Pt seen and evaluated. Is planning on being discharged soon back home with home health care. Patient evaluated for multiple medical devices and DME. Overall medically stable but is having issues with constipation. Strength is overall at baseline at this time. No additional issues.        Review of Systems   Constitutional: Negative.    HENT: Negative.     Respiratory: Negative.     Cardiovascular: Negative.    Gastrointestinal:  Positive for constipation.   Musculoskeletal: Negative.    Skin: Negative.    Neurological: Negative.    Psychiatric/Behavioral: Negative.         ObjectiveVitals Reviewed via facility EMR   Physical Exam  Vitals and nursing note reviewed.   Constitutional:       General: He is not in acute distress.     Appearance: Normal appearance.   HENT:      Mouth/Throat:      Mouth: Mucous membranes are moist.      Pharynx: Oropharynx is clear. No oropharyngeal exudate.   Eyes:      Extraocular Movements: Extraocular movements intact.      Pupils: Pupils are equal, round, and reactive to light.   Cardiovascular:      Rate and Rhythm: Normal rate and regular rhythm.      Pulses: Normal pulses.      Heart sounds: Normal heart sounds. No murmur heard.  Pulmonary:      Effort: Pulmonary effort is normal. No respiratory distress.   Abdominal:      General: Abdomen is flat. Bowel sounds are normal. There is no distension.      Tenderness: There is no abdominal tenderness.   Musculoskeletal:         General: Normal range of motion.      Right lower leg: No edema.      Left lower leg: No edema.   Skin:     General: Skin is warm and dry.      Capillary Refill: Capillary refill takes less than 2 seconds.   Neurological:      General: No focal deficit present.      Mental Status: He is alert. Mental status is at baseline.      Cranial Nerves: No cranial nerve deficit.      Motor: No  weakness.   Psychiatric:         Mood and Affect: Mood normal.         Thought Content: Thought content normal.         Assessment/Plan  Diagnoses and all orders for this visit:  Hyperlipidemia, unspecified hyperlipidemia type  Chronic atrial fibrillation (CMS/HCC)  Stage 3a chronic kidney disease  Depression, recurrent (CMS/HCC)  Cerebrovascular accident (CVA) due to stenosis of cerebral artery (CMS/HCC)  Chronic respiratory failure with hypoxia (CMS/HCC)  Other constipation  Pt seen and examined, start bowel regimen and discharge patient on this. Continue supportive care, patient medically stable and appropriate for discharge however is also high risk of readmission. No additional issues        Electronically Signed By: Alex Beebe DO   7/20/23  8:32 AM

## 2023-07-19 ENCOUNTER — PATIENT OUTREACH (OUTPATIENT)
Dept: CARE COORDINATION | Facility: CLINIC | Age: 88
End: 2023-07-19
Payer: COMMERCIAL

## 2023-07-19 ENCOUNTER — DOCUMENTATION (OUTPATIENT)
Dept: CARE COORDINATION | Facility: CLINIC | Age: 88
End: 2023-07-19
Payer: COMMERCIAL

## 2023-07-19 DIAGNOSIS — I63.9 CEREBROVASCULAR ACCIDENT (CVA), UNSPECIFIED MECHANISM (MULTI): ICD-10-CM

## 2023-07-19 RX ORDER — SOTALOL HYDROCHLORIDE 120 MG/1
60 TABLET ORAL DAILY
COMMUNITY
Start: 2023-07-13

## 2023-07-19 RX ORDER — ALFUZOSIN HYDROCHLORIDE 10 MG/1
10 TABLET, EXTENDED RELEASE ORAL DAILY
COMMUNITY
Start: 2023-07-13

## 2023-07-19 RX ORDER — ATORVASTATIN CALCIUM 80 MG/1
80 TABLET, FILM COATED ORAL NIGHTLY
COMMUNITY
Start: 2023-07-13

## 2023-07-19 NOTE — PROGRESS NOTES
Discharge Facility:[facility]  Discharge Diagnosis:I63.9 Stroke  Admission Date:6/8/23  Discharge Date: 7/18/23    [facility] 6/2-6/8    PCP Appointment Date:Task sent to the office  Specialist Appointment Date:   Hospital Encounter and Summary: Linked Hospital Summary  See discharge assessment below for further details    Engagement  Call Start Time: 1235 (7/19/2023 12:35 PM)    Medications  Medications reviewed with patient/caregiver?: Yes (7/19/2023 12:35 PM)  Is the patient having any side effects they believe may be caused by any medication additions or changes?: No (7/19/2023 12:35 PM)  Does the patient have all medications ordered at discharge?: Yes (7/19/2023 12:35 PM)  Care Management Interventions: Provided patient education (7/19/2023 12:35 PM)  Is the patient taking all medications as directed (includes completed medication regime)?: Yes (7/19/2023 12:35 PM)  Care Management Interventions: Provided patient education (7/19/2023 12:35 PM)  Medication Comments: Alfuzosin 10mg, Atorvastatin 80mg, Sotalol 60mg (7/19/2023 12:35 PM)    Appointments  Does the patient have a primary care provider?: Yes (7/19/2023 12:35 PM)  Care Management Interventions: Advised patient to make appointment; Educated patient on importance of making appointment (7/19/2023 12:35 PM)  Has the patient kept scheduled appointments due by today?: Yes (7/19/2023 12:35 PM)    Self Management  What is the home health agency?: Unsure (Patient has private company) (7/19/2023 12:35 PM)  Has home health visited the patient within 72 hours of discharge?: Call prior to 72 hours (7/19/2023 12:35 PM)    Patient Teaching  Does the patient have access to their discharge instructions?: Yes (7/19/2023 12:35 PM)  Care Management Interventions: Reviewed instructions with patient (7/19/2023 12:35 PM)  What is the patient's perception of their health status since discharge?: Improving (7/19/2023 12:35 PM)  Is the patient/caregiver able to  teach back the hierarchy of who to call/visit for symptoms/problems? PCP, Specialist, Home Health nurse, Urgent Care, ED, 911: Yes (7/19/2023 12:35 PM)

## 2023-07-20 PROBLEM — K59.09 OTHER CONSTIPATION: Status: ACTIVE | Noted: 2023-07-20

## 2023-07-20 PROCEDURE — G0180 MD CERTIFICATION HHA PATIENT: HCPCS | Performed by: FAMILY MEDICINE

## 2023-07-20 ASSESSMENT — ENCOUNTER SYMPTOMS
NEUROLOGICAL NEGATIVE: 1
RESPIRATORY NEGATIVE: 1
CARDIOVASCULAR NEGATIVE: 1
CONSTITUTIONAL NEGATIVE: 1
MUSCULOSKELETAL NEGATIVE: 1
CONSTIPATION: 1
PSYCHIATRIC NEGATIVE: 1

## 2023-07-20 NOTE — PROGRESS NOTES
Subjective   Patient ID: Dean Florian is a 89 y.o. male.    Pt seen and evaluated. Is planning on being discharged soon back home with home health care. Patient evaluated for multiple medical devices and DME. Overall medically stable but is having issues with constipation. Strength is overall at baseline at this time. No additional issues.        Review of Systems   Constitutional: Negative.    HENT: Negative.     Respiratory: Negative.     Cardiovascular: Negative.    Gastrointestinal:  Positive for constipation.   Musculoskeletal: Negative.    Skin: Negative.    Neurological: Negative.    Psychiatric/Behavioral: Negative.         Objective Vitals Reviewed via facility EMR   Physical Exam  Vitals and nursing note reviewed.   Constitutional:       General: He is not in acute distress.     Appearance: Normal appearance.   HENT:      Mouth/Throat:      Mouth: Mucous membranes are moist.      Pharynx: Oropharynx is clear. No oropharyngeal exudate.   Eyes:      Extraocular Movements: Extraocular movements intact.      Pupils: Pupils are equal, round, and reactive to light.   Cardiovascular:      Rate and Rhythm: Normal rate and regular rhythm.      Pulses: Normal pulses.      Heart sounds: Normal heart sounds. No murmur heard.  Pulmonary:      Effort: Pulmonary effort is normal. No respiratory distress.   Abdominal:      General: Abdomen is flat. Bowel sounds are normal. There is no distension.      Tenderness: There is no abdominal tenderness.   Musculoskeletal:         General: Normal range of motion.      Right lower leg: No edema.      Left lower leg: No edema.   Skin:     General: Skin is warm and dry.      Capillary Refill: Capillary refill takes less than 2 seconds.   Neurological:      General: No focal deficit present.      Mental Status: He is alert. Mental status is at baseline.      Cranial Nerves: No cranial nerve deficit.      Motor: No weakness.   Psychiatric:         Mood and Affect: Mood normal.          Thought Content: Thought content normal.         Assessment/Plan   Diagnoses and all orders for this visit:  Hyperlipidemia, unspecified hyperlipidemia type  Chronic atrial fibrillation (CMS/HCC)  Stage 3a chronic kidney disease  Depression, recurrent (CMS/HCC)  Cerebrovascular accident (CVA) due to stenosis of cerebral artery (CMS/HCC)  Chronic respiratory failure with hypoxia (CMS/HCC)  Other constipation  Pt seen and examined, start bowel regimen and discharge patient on this. Continue supportive care, patient medically stable and appropriate for discharge however is also high risk of readmission. No additional issues

## 2023-07-21 ENCOUNTER — TELEPHONE (OUTPATIENT)
Dept: PRIMARY CARE | Facility: CLINIC | Age: 88
End: 2023-07-21
Payer: COMMERCIAL

## 2023-07-21 NOTE — TELEPHONE ENCOUNTER
----- Message from Angela Horowitz LPN sent at 7/19/2023 12:32 PM EDT -----  Regarding: TCM appt needed  This patient was discharged from: [facility]  Discharge diagnosis: Stroke  Date of discharge:  7/18/23      No PCP appointments available within 14 days of discharge. Please reach out to patient and schedule an appointment within 7-13 days from discharge date.    Daughter is requesting a virtual visit doesn't think she can get patient out of the house.

## 2023-07-26 ENCOUNTER — TELEMEDICINE (OUTPATIENT)
Dept: PRIMARY CARE | Facility: CLINIC | Age: 88
End: 2023-07-26
Payer: MEDICARE

## 2023-07-26 DIAGNOSIS — I25.119 CORONARY ARTERY DISEASE INVOLVING NATIVE CORONARY ARTERY OF NATIVE HEART WITH ANGINA PECTORIS (CMS-HCC): ICD-10-CM

## 2023-07-26 DIAGNOSIS — J96.11 CHRONIC RESPIRATORY FAILURE WITH HYPOXIA (MULTI): ICD-10-CM

## 2023-07-26 DIAGNOSIS — I10 PRIMARY HYPERTENSION: ICD-10-CM

## 2023-07-26 DIAGNOSIS — R13.10 DYSPHAGIA, UNSPECIFIED TYPE: ICD-10-CM

## 2023-07-26 DIAGNOSIS — Z97.8 FOLEY CATHETER IN PLACE: ICD-10-CM

## 2023-07-26 DIAGNOSIS — I63.9 CEREBROVASCULAR ACCIDENT (CVA), UNSPECIFIED MECHANISM (MULTI): Primary | ICD-10-CM

## 2023-07-26 DIAGNOSIS — N40.0 BENIGN PROSTATIC HYPERPLASIA, UNSPECIFIED WHETHER LOWER URINARY TRACT SYMPTOMS PRESENT: ICD-10-CM

## 2023-07-26 DIAGNOSIS — E78.5 HYPERLIPIDEMIA, UNSPECIFIED HYPERLIPIDEMIA TYPE: ICD-10-CM

## 2023-07-26 DIAGNOSIS — I50.32 CHRONIC DIASTOLIC CONGESTIVE HEART FAILURE (MULTI): ICD-10-CM

## 2023-07-26 DIAGNOSIS — I48.0 PAROXYSMAL ATRIAL FIBRILLATION (MULTI): ICD-10-CM

## 2023-07-26 PROCEDURE — 99214 OFFICE O/P EST MOD 30 MIN: CPT | Performed by: NURSE PRACTITIONER

## 2023-07-26 PROCEDURE — 99495 TRANSJ CARE MGMT MOD F2F 14D: CPT | Performed by: NURSE PRACTITIONER

## 2023-07-26 NOTE — PROGRESS NOTES
General Medical Management Note    89 y.o. male presents for hospital follow up  Daughter, Emi,  facilitates virtual visit and answers all questions    Virtual or Telephone Consent    An interactive audio and video telecommunication system which permits real time communications between the patient (at the originating site) and provider (at the distant site) was utilized to provide this telehealth service.   Verbal consent was requested and obtained from Dean Florian on this date, 07/26/23 for a telehealth visit.     HPI   6/4/22 Admitted to [facility] under diagnosis diastolic heart failure.   Secondary diagnosis:   Dysphagia, CAD, O2 dependence, resp failure with hypoxia, Afib.     6/1/23 Developed Right arm/leg weakness.   Family chose to send him to [facility]  Diagnosed with a   Dtr signed revocation and will follow up with hospice post discharge    Was admitted to [facility] 6/8/23-7/18/23  Discharged to home with his dtr with  HHC, Nursing, PT, OT, aide and ST.     He continues with right sided weakness but has improved slightly since discharge from hospital.   Dtr states he continues with difficulty swallowing at times.  Food and liquid intake down.    He is supposed to have thickened liquids but he does not like it.   Dtr states speech therapy is supposed to be scheduled to make a visit.     Pt experienced a syncopal episode yesterday when working with PT.     Left frontal cortex subacute ischemia CVA: Neuro Dr Nelly Cabrera  Meds: Plavix, Atorvastatin    Afib, HTN, Diastolic CHF, CAD:   Watchman device (2019)  Previously managed by Dr Onel Sloan who is moving his practice.   He will be managed by EPS Dr Antonio Walters   Meds: Sotalol 1/2 of a 120 mg qd, Lasix 20 mg bid, Plavix   He is also ordered Alfuzosin 10 mg for HTN and BPH. He was instructed to hold Alfuzosin if dizzy.  He is NOT taking Alfuzosin.     Urinary retention and BPH:   He currently has a suárez catheter. Daughter states  that he failed 2 trials of suárez removal.     Chronic resp failure:   Remains on O2    HLD:   Med: Atorvastatin 80 mg    Hypothyroidism:   Med: Levothyroxine 75 mcg 3 times a week      Past Medical History:   Diagnosis Date    Allergic     Arthritis     Cachexia (CMS/[facility]) 05/24/2021    Inanition    Cataract     CHF (congestive heart failure) (CMS/[facility])     Chronic kidney disease     Depression     Disease of thyroid gland     Elevated prostate specific antigen (PSA)     Abnormal PSA    Encounter for examination of eyes and vision without abnormal findings     Normal eye and vision exam    Encounter for screening for osteoporosis     Screening for osteoporosis    Heart disease     HL (hearing loss)     Hypertension     Personal history of other diseases of the digestive system     History of hemorrhoids    Personal history of other diseases of the musculoskeletal system and connective tissue     History of arthritis    Personal history of other diseases of the nervous system and sense organs     History of migraine    Personal history of other infectious and parasitic diseases 04/28/2017    History of herpes zoster    Personal history of other infectious and parasitic diseases     History of varicella    Personal history of other infectious and parasitic diseases     History of measles    Stroke (CMS/[facility])     Unspecified injury of head, subsequent encounter 12/03/2018    Head trauma, subsequent encounter    Visual impairment       Past Surgical History:   Procedure Laterality Date    APPENDECTOMY  05/19/2016    Appendectomy    CARDIAC SURGERY  12/02/2014    Heart Surgery    COLONOSCOPY  12/02/2014    Complete Colonoscopy    HERNIA REPAIR  08/22/2016    Inguinal Hernia Repair    HERNIA REPAIR  06/01/2016    Inguinal Hernia Repair    MR HEAD ANGIO WO IV CONTRAST  6/2/2023    MR HEAD ANGIO WO IV CONTRAST 6/2/2023 [facility]    MR NECK ANGIO WO IV CONTRAST  6/2/2023    MR NECK ANGIO WO IV CONTRAST 6/2/2023 [facility]    OTHER  SURGICAL HISTORY  02/20/2019    Atrial appendage closure device insertion    TONSILLECTOMY  12/02/2014    Tonsillectomy     Family History   Problem Relation Name Age of Onset    Anemia Mother      Hypertension Mother      Throat cancer Father        Social History     Socioeconomic History    Marital status:      Spouse name: Not on file    Number of children: Not on file    Years of education: Not on file    Highest education level: Not on file   Occupational History    Not on file   Tobacco Use    Smoking status: Never    Smokeless tobacco: Never   Substance and Sexual Activity    Alcohol use: Not Currently    Drug use: Never    Sexual activity: Not Currently   Other Topics Concern    Not on file   Social History Narrative    Not on file     Social Determinants of Health     Financial Resource Strain: Not on file   Food Insecurity: Not on file   Transportation Needs: Not on file   Physical Activity: Not on file   Stress: Not on file   Social Connections: Not on file   Intimate Partner Violence: Not on file   Housing Stability: Not on file       Current Outpatient Medications   Medication Instructions    albuterol 2.5 mg, nebulization    alfuzosin (UROXATRAL) 10 mg, oral, Daily    atorvastatin (LIPITOR) 80 mg, oral, Nightly    clopidogrel (Plavix) 75 mg tablet 1 tablet, oral, Daily    ferrous gluconate 236 mg (27 mg iron) tablet 1 tablet, oral, Daily    furosemide (Lasix) 20 mg tablet oral, 2 times daily    levothyroxine (Synthroid, Levoxyl) 75 mcg tablet 1 tablet, oral, 3 times weekly, Take 1 tablet (75 mcg) by mouth 3 times a week.    sotalol AF 60 mg, oral, Daily           Allergies   Allergen Reactions    Demeclocycline Unknown    Tamsulosin Unknown         ROS: Denies chest pain, SOB, Headache, GI problems     Visit Vitals  Smoking Status Never        PHYSICAL EXAM:  Alert. Attempted to make eye contact via video when prompted by daughter a few times. Appears fatigued  Speech is quite. Unable to  understand at times r/t quality of voice.   Noted him to cough after drinking fluids despite HOB elevated.      DIAGNOSIS/PLAN:    1. Cerebrovascular accident (CVA), unspecified mechanism (CMS/[facility])  Follow-up with specialist per their discretion/direction.   Continue [facility] with [facility] PT/OT/Speech  He is dependent on all ADLS    2. Paroxysmal atrial fibrillation (CMS/[facility])  Follow-up with specialist per their discretion/direction.     3. Primary hypertension  Follow-up with specialist per their discretion/direction.   Wear leandra hose on in AM and off in PM  Please check orthostatics b/p and pulse over the next couple days. Report findings to my office and Dr Antonio Walters.   Trial decreasing Lasix to daily to help with low b/p    4. Chronic diastolic congestive heart failure (CMS/[facility])  Follow-up with specialist per their discretion/direction.     5. Coronary artery disease involving native coronary artery of native heart with angina pectoris (CMS/[facility])  Follow-up with specialist per their discretion/direction.     6. Benign prostatic hyperplasia, unspecified whether lower urinary tract symptoms present  Currently with suárez. He has failed to removal attempts.  Plan was to begin Alfuzosin    7. Suárez catheter in place  See #6    8. Chronic respiratory failure with hypoxia (CMS/[facility])  Continue O2. Encourage repositioning and deep breaths. If incentive spirometer available please utilize.     9. Hyperlipidemia, unspecified hyperlipidemia type  Follow-up with specialist per their discretion/direction.     10. Dysphagia, unspecified type  Aware that thin liquids may cause aspiration leading to possible death.   Speech therapy to evaluate.    Family will reach out to [facility] to discuss future plans of restarting hospice when the family is ready.     75 min total spent on this encounter.       Medications refills will be completed as discussed.     Any labs or testing that is ordered will be reviewed  and the results will be in your chart .   You can review these via  [employer].     Follow up six months for complete physical exam.    Prescriptions will not be filled unless you are compliant with your follow-up appointments or have a follow-up appointment scheduled as per the instruction of your provider. Refills for medications should be requested at the time of your office visit.     Please allow one week for refill requests to be completed.     Contact office with any questions or concerns.   Preferred communication is via  [employer]  Please contact [email or web address] if having issues with  [employer]ob[employer]    Emi Girard APRVIVEK-[facility]  [address], [address]  [place] [address]  Phone: [phone]    **Charting was completed using voice recognition technology and may include unintended errors**

## 2023-07-27 PROBLEM — I50.32 CHRONIC DIASTOLIC CONGESTIVE HEART FAILURE (MULTI): Status: ACTIVE | Noted: 2023-07-27

## 2023-07-27 PROBLEM — I63.9 CEREBROVASCULAR ACCIDENT (CVA) (MULTI): Status: ACTIVE | Noted: 2023-06-11

## 2023-07-27 PROBLEM — Z97.8 FOLEY CATHETER IN PLACE: Status: ACTIVE | Noted: 2023-07-27

## 2023-07-27 PROBLEM — R13.10 DYSPHAGIA: Status: ACTIVE | Noted: 2023-07-27

## 2023-08-02 ENCOUNTER — PATIENT OUTREACH (OUTPATIENT)
Dept: CARE COORDINATION | Facility: CLINIC | Age: 88
End: 2023-08-02
Payer: COMMERCIAL

## 2023-08-02 ENCOUNTER — HOME VISIT (OUTPATIENT)
Dept: PRIMARY CARE | Facility: CLINIC | Age: 88
End: 2023-08-02
Payer: MEDICARE

## 2023-08-02 DIAGNOSIS — R13.12 OROPHARYNGEAL DYSPHAGIA: ICD-10-CM

## 2023-08-02 DIAGNOSIS — I69.391 DYSPHAGIA DUE TO RECENT STROKE: ICD-10-CM

## 2023-08-02 DIAGNOSIS — I70.90 ARTERIOSCLEROTIC VASCULAR DISEASE: ICD-10-CM

## 2023-08-02 DIAGNOSIS — I69.252: Primary | ICD-10-CM

## 2023-08-02 DIAGNOSIS — I50.33 ACUTE ON CHRONIC DIASTOLIC CONGESTIVE HEART FAILURE (MULTI): ICD-10-CM

## 2023-08-02 PROBLEM — I69.952: Status: ACTIVE | Noted: 2023-08-02

## 2023-08-02 PROCEDURE — G0180 MD CERTIFICATION HHA PATIENT: HCPCS | Performed by: FAMILY MEDICINE

## 2023-08-02 NOTE — PROGRESS NOTES
Call regarding appt. with PCP on 7/26/23 after hospitalization.  At time of outreach call the patient feels as if their condition has improved since last visit.  Reviewed the PCP appointment with the pt and addressed any questions or concerns.

## 2023-08-02 NOTE — PROGRESS NOTES
Home Health initial Cerification  Provider:  [facility]. 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

## 2023-08-21 ENCOUNTER — TELEPHONE (OUTPATIENT)
Dept: PRIMARY CARE | Facility: CLINIC | Age: 88
End: 2023-08-21
Payer: COMMERCIAL

## 2023-08-21 DIAGNOSIS — R60.0 EDEMA OF BOTH LOWER EXTREMITIES: ICD-10-CM

## 2023-08-21 DIAGNOSIS — I10 PRIMARY HYPERTENSION: Primary | ICD-10-CM

## 2023-08-21 NOTE — TELEPHONE ENCOUNTER
"REFILL REQUEST    Med: Furosemide   Med Dose: 20 mg  Med Frequency: one tab daily    Pharmacy: [facility]  Pharmacy Address: [id number] [place] [address] in [place]    LR: Only shows \"recorded as history\"  LV: 07/26/2023  NV: None  "

## 2023-08-22 RX ORDER — FUROSEMIDE 20 MG/1
20 TABLET ORAL DAILY
Qty: 90 TABLET | Refills: 2 | Status: SHIPPED | OUTPATIENT
Start: 2023-08-22 | End: 2023-12-07

## 2023-08-25 RX ORDER — LOVASTATIN 40 MG/1
TABLET ORAL
COMMUNITY

## 2023-08-25 RX ORDER — AMIODARONE HYDROCHLORIDE 200 MG/1
TABLET ORAL
COMMUNITY

## 2023-08-25 RX ORDER — KETOCONAZOLE 20 MG/G
CREAM TOPICAL
COMMUNITY
Start: 2021-04-08

## 2023-08-25 RX ORDER — WARFARIN SODIUM 5 MG/1
TABLET ORAL
COMMUNITY

## 2023-08-25 RX ORDER — CEPHALEXIN 500 MG/1
CAPSULE ORAL
COMMUNITY
Start: 2023-08-23 | End: 2023-09-01

## 2023-08-25 RX ORDER — CARVEDILOL 3.12 MG/1
TABLET ORAL
COMMUNITY

## 2023-08-25 RX ORDER — PANTOPRAZOLE SODIUM 40 MG/1
TABLET, DELAYED RELEASE ORAL
COMMUNITY

## 2023-08-28 ENCOUNTER — APPOINTMENT (OUTPATIENT)
Dept: PRIMARY CARE | Facility: CLINIC | Age: 88
End: 2023-08-28
Payer: COMMERCIAL

## 2023-08-29 ENCOUNTER — PATIENT OUTREACH (OUTPATIENT)
Dept: CARE COORDINATION | Facility: CLINIC | Age: 88
End: 2023-08-29
Payer: COMMERCIAL

## 2023-08-29 ENCOUNTER — TELEPHONE (OUTPATIENT)
Dept: PRIMARY CARE | Facility: CLINIC | Age: 88
End: 2023-08-29
Payer: COMMERCIAL

## 2023-08-29 DIAGNOSIS — N30.00 ACUTE CYSTITIS WITHOUT HEMATURIA: ICD-10-CM

## 2023-08-29 DIAGNOSIS — J18.9 PNEUMONIA DUE TO INFECTIOUS ORGANISM, UNSPECIFIED LATERALITY, UNSPECIFIED PART OF LUNG: ICD-10-CM

## 2023-08-29 RX ORDER — AMOXICILLIN AND CLAVULANATE POTASSIUM 875; 125 MG/1; MG/1
TABLET, FILM COATED ORAL 2 TIMES DAILY
COMMUNITY
Start: 2023-08-28 | End: 2023-08-31

## 2023-08-29 RX ORDER — DOXYCYCLINE 100 MG/1
CAPSULE ORAL 2 TIMES DAILY
COMMUNITY
Start: 2023-08-28 | End: 2023-08-29

## 2023-08-29 NOTE — PROGRESS NOTES
Discharge Facility:[facility]  Discharge Diagnosis:N30.00 Acute cystitis, J18.9 Pnemonia   Admission Date:8/25/23  Discharge Date:8/28/23    PCP Appointment Date:Task sent to office  Specialist Appointment Date:   Hospital Encounter and Summary: Linked   See discharge assessment below for further details    Engagement  Call Start Time: 1141 (8/29/2023 11:41 AM)    Medications  Medications reviewed with patient/caregiver?: Yes (8/29/2023 11:41 AM)  Is the patient having any side effects they believe may be caused by any medication additions or changes?: No (8/29/2023 11:41 AM)  Does the patient have all medications ordered at discharge?: Yes (8/29/2023 11:41 AM)  Care Management Interventions: Provided patient education (8/29/2023 11:41 AM)  Is the patient taking all medications as directed (includes completed medication regime)?: Yes (8/29/2023 11:41 AM)  Care Management Interventions: Provided patient education (8/29/2023 11:41 AM)  Medication Comments: Augmentin 875/125mg, Doxycycline 100mg, Furosemide 20mg (8/29/2023 11:41 AM)    Appointments  Does the patient have a primary care provider?: Yes (8/29/2023 11:41 AM)  Care Management Interventions: Advised patient to make appointment (8/29/2023 11:41 AM)  Has the patient kept scheduled appointments due by today?: Yes (8/29/2023 11:41 AM)    Self Management  What is the home health agency?:  [facility] (8/29/2023 11:41 AM)  Has home health visited the patient within 72 hours of discharge?: Call prior to 72 hours (8/29/2023 11:41 AM)    Patient Teaching  Does the patient have access to their discharge instructions?: Yes (8/29/2023 11:41 AM)  Care Management Interventions: Reviewed instructions with patient (8/29/2023 11:41 AM)  What is the patient's perception of their health status since discharge?: Same (8/29/2023 11:41 AM)  Is the patient/caregiver able to teach back the hierarchy of who to call/visit for symptoms/problems? PCP, Specialist, Home Health nurse, Urgent  [facility], ED, 911: Yes (8/29/2023 11:41 AM)

## 2023-08-30 ENCOUNTER — TELEMEDICINE (OUTPATIENT)
Dept: PRIMARY CARE | Facility: CLINIC | Age: 88
End: 2023-08-30
Payer: MEDICARE

## 2023-08-30 DIAGNOSIS — N30.00 ACUTE CYSTITIS WITHOUT HEMATURIA: Primary | ICD-10-CM

## 2023-08-30 DIAGNOSIS — R39.14 BENIGN PROSTATIC HYPERPLASIA WITH INCOMPLETE BLADDER EMPTYING: ICD-10-CM

## 2023-08-30 DIAGNOSIS — N18.32 STAGE 3B CHRONIC KIDNEY DISEASE (MULTI): ICD-10-CM

## 2023-08-30 DIAGNOSIS — I50.33 ACUTE ON CHRONIC DIASTOLIC CONGESTIVE HEART FAILURE (MULTI): ICD-10-CM

## 2023-08-30 DIAGNOSIS — N39.0 RECURRENT UTI (URINARY TRACT INFECTION): ICD-10-CM

## 2023-08-30 DIAGNOSIS — N40.1 BENIGN PROSTATIC HYPERPLASIA WITH INCOMPLETE BLADDER EMPTYING: ICD-10-CM

## 2023-08-30 DIAGNOSIS — R26.89 IMPAIRMENT OF BALANCE: ICD-10-CM

## 2023-08-30 DIAGNOSIS — K59.00 CONSTIPATION, UNSPECIFIED CONSTIPATION TYPE: ICD-10-CM

## 2023-08-30 DIAGNOSIS — Z97.8 FOLEY CATHETER IN PLACE: ICD-10-CM

## 2023-08-30 PROCEDURE — 99214 OFFICE O/P EST MOD 30 MIN: CPT | Performed by: FAMILY MEDICINE

## 2023-08-30 RX ORDER — CIPROFLOXACIN 250 MG/1
250 TABLET, FILM COATED ORAL EVERY OTHER DAY
Qty: 60 TABLET | Refills: 2 | Status: SHIPPED | OUTPATIENT
Start: 2023-08-30 | End: 2023-12-07

## 2023-08-30 NOTE — PATIENT INSTRUCTIONS
BM maintenance - suggest Miralax once/twice per week or MOM 30ml;  vegetables    Continue Lasix 20mg daily to promote bladder emptying.    Start a probiotic (culturelle or Align) daily while on antibiotic and for one week after completes antibiotic.  Then one week every one month.

## 2023-08-30 NOTE — PROGRESS NOTES
Virtual or Telephone Consent    An interactive audio and video telecommunication system which permits real time communications between the patient (at the originating site) and provider (at the distant site) was utilized to provide this telehealth service.   Verbal consent was requested and obtained from daughter of Dean Florian on this date, 08/30/23 for a telehealth visit.   Subjective     Patient ID: [id number]     Dean Florian is a 89 y.o. male who presents for Hospital Follow-up.  Not in hospice to due PT/OT, but is appropriate for hospice if not progressing in therapy.    HPI  Wed 8/23/23 to ER due to increased confusion.  Dx UTI.  Tx Augmentin.  Returned to ER 8/26/23 due to right flank pain.  Dx constipation, pneumonia.  Colon cleaned out with laxatives.  IVF given.      GFR 36-38.  Electrolytes WNL    Zarate cath - not changed at hospital because recently changed at home.    Going back and forth with dtr via MyChart regarding leg edema and Lasix dose.  Currently taking Lasix 20 mg daily.  Patient not great about fluid intake.    Need order to resume HHA due to hospitalization.    Objective   There were no vitals taken for this visit.   Physical Exam:   Patient asleep. No distress    Assessment/Plan   1. Acute cystitis without hematuria  Complete Augmentin;  use probiotic for 7 days each month  - Referral to Home Care; Future    2. Constipation, unspecified constipation type  - Referral to Home Care; Future    3. Stage 3b chronic kidney disease (CMS/HCC)  - Referral to Home Care; Future    4. Acute on chronic diastolic congestive heart failure (CMS/[facility])  - Referral to Home Care; Future    5. Benign prostatic hyperplasia with incomplete bladder emptying  - Referral to Home Care; Future    6. Zarate catheter in place  - Referral to Home Care; Future    7. Impairment of balance  - Referral to Home Care; Future    8. Recurrent UTI (urinary tract infection)  Start prophylactic ATB after completing Augmentin  -  ciprofloxacin (Cipro) 250 mg tablet; Take 1 tablet (250 mg) by mouth every other day.  Dispense: 60 tablet; Refill: 2        Follow up in 3-6 months for medical management    I will continue to monitor, evaluate, assess and treat all problems/diagnoses as appropriate and continue to collaborate with specialists.    Contact office or send a  [employer] message with any questions or concerns    Encouraged to sign up with  [employer]  Patient will only be notified of labs that require medical intervention.    Prescriptions will not be filled unless you are compliant with your follow up appointments or have a follow up appointment scheduled as per instruction of your physician. Refills should be requested at the time of your visit.    **Charting was completed using voice recognition technology and may include unintended errors**    Vito Pizarro DO, FACOFP  Senior Attending Physician  [facility]  [address]  [place] [address] [phone]     Problem List Items Addressed This Visit    None      Vito Pizarro DO

## 2023-09-05 ENCOUNTER — TELEPHONE (OUTPATIENT)
Dept: PRIMARY CARE | Facility: CLINIC | Age: 88
End: 2023-09-05
Payer: COMMERCIAL

## 2023-09-05 NOTE — TELEPHONE ENCOUNTER
REFILL REQUEST    Med: Atorvastatin   Med Dose: 80 mg  Med Frequency: one tab daily    Pharmacy: [facility]  Pharmacy Address: [id number] [place] [address] in [place]    LR: Never filled by [facility]  LV: 08/30/2023 (virtual)  NV: None

## 2023-09-11 ENCOUNTER — TELEPHONE (OUTPATIENT)
Dept: PRIMARY CARE | Facility: CLINIC | Age: 88
End: 2023-09-11
Payer: COMMERCIAL

## 2023-09-12 LAB
APPEARANCE, URINE: ABNORMAL
BACTERIA, URINE: ABNORMAL /HPF
BILIRUBIN, URINE: NEGATIVE
BLOOD, URINE: ABNORMAL
BUDDING YEAST, URINE: PRESENT /HPF
COLOR, URINE: YELLOW
GLUCOSE, URINE: NEGATIVE MG/DL
HYALINE CASTS, URINE: ABNORMAL /LPF
KETONES, URINE: NEGATIVE MG/DL
LEUKOCYTE ESTERASE, URINE: ABNORMAL
MUCUS, URINE: ABNORMAL /LPF
NITRITE, URINE: POSITIVE
PH, URINE: 6 (ref 5–8)
PROTEIN, URINE: ABNORMAL MG/DL
RBC, URINE: 109 /HPF (ref 0–5)
SPECIFIC GRAVITY, URINE: 1.02 (ref 1–1.03)
UROBILINOGEN, URINE: <2 MG/DL (ref 0–1.9)
WBC, URINE: >182 /HPF (ref 0–5)

## 2023-09-12 NOTE — TELEPHONE ENCOUNTER
RN Dorina with  Home Health called.  P# [phone]    She wanted to let you know that a referral has been put in for hospice.  Dorina spoke with daughter Emi and she is in agreement.   The order was sent to [facility].  Dorina said to call her if you have any questions.

## 2023-09-15 LAB — URINE CULTURE: ABNORMAL

## 2023-09-20 ENCOUNTER — PATIENT OUTREACH (OUTPATIENT)
Dept: CARE COORDINATION | Facility: CLINIC | Age: 88
End: 2023-09-20
Payer: COMMERCIAL

## 2023-09-20 ENCOUNTER — TELEPHONE (OUTPATIENT)
Dept: PRIMARY CARE | Facility: CLINIC | Age: 88
End: 2023-09-20
Payer: COMMERCIAL

## 2023-09-20 NOTE — TELEPHONE ENCOUNTER
Hospice called to update - pt is home on hospice services, minimally responsive, ordered oxygen for SOB, hospice dx is cardiovascular disease

## 2023-09-20 NOTE — PROGRESS NOTES
Unable to reach patient for call back after patient's follow up appointment with PCP.   GERSONM with call back number for patient to call if needed   If no voicemail available call attempts x 2 were made to contact the patient to assist with any questions or concerns patient may have.

## 2023-10-20 ENCOUNTER — PATIENT OUTREACH (OUTPATIENT)
Dept: CARE COORDINATION | Facility: CLINIC | Age: 88
End: 2023-10-20
Payer: COMMERCIAL

## 2023-11-17 ENCOUNTER — PATIENT OUTREACH (OUTPATIENT)
Dept: CARE COORDINATION | Facility: CLINIC | Age: 88
End: 2023-11-17
Payer: COMMERCIAL

## 2023-11-18 ENCOUNTER — LAB REQUISITION (OUTPATIENT)
Dept: LAB | Facility: HOSPITAL | Age: 88
End: 2023-11-18
Payer: MEDICARE

## 2023-11-18 LAB
APPEARANCE UR: ABNORMAL
BACTERIA #/AREA URNS AUTO: ABNORMAL /HPF
BILIRUB UR STRIP.AUTO-MCNC: NEGATIVE MG/DL
COLOR UR: YELLOW
GLUCOSE UR STRIP.AUTO-MCNC: NEGATIVE MG/DL
KETONES UR STRIP.AUTO-MCNC: NEGATIVE MG/DL
LEUKOCYTE ESTERASE UR QL STRIP.AUTO: ABNORMAL
NITRITE UR QL STRIP.AUTO: NEGATIVE
PH UR STRIP.AUTO: 5 [PH]
PROT UR STRIP.AUTO-MCNC: ABNORMAL MG/DL
RBC # UR STRIP.AUTO: ABNORMAL /UL
RBC #/AREA URNS AUTO: >20 /HPF
SP GR UR STRIP.AUTO: 1.02
UROBILINOGEN UR STRIP.AUTO-MCNC: <2 MG/DL
WBC #/AREA URNS AUTO: >50 /HPF
YEAST BUDDING #/AREA UR COMP ASSIST: PRESENT /HPF

## 2023-11-18 PROCEDURE — 81001 URINALYSIS AUTO W/SCOPE: CPT

## 2023-11-18 PROCEDURE — 87086 URINE CULTURE/COLONY COUNT: CPT

## 2023-11-20 LAB — BACTERIA UR CULT: ABNORMAL
